# Patient Record
Sex: FEMALE | Race: WHITE | NOT HISPANIC OR LATINO
[De-identification: names, ages, dates, MRNs, and addresses within clinical notes are randomized per-mention and may not be internally consistent; named-entity substitution may affect disease eponyms.]

---

## 2022-09-21 ENCOUNTER — TRANSCRIPTION ENCOUNTER (OUTPATIENT)
Age: 72
End: 2022-09-21

## 2022-12-03 ENCOUNTER — TRANSCRIPTION ENCOUNTER (OUTPATIENT)
Age: 72
End: 2022-12-03

## 2022-12-13 ENCOUNTER — TRANSCRIPTION ENCOUNTER (OUTPATIENT)
Age: 72
End: 2022-12-13

## 2023-01-04 ENCOUNTER — APPOINTMENT (OUTPATIENT)
Dept: THORACIC SURGERY | Facility: HOSPITAL | Age: 73
End: 2023-01-04

## 2023-01-04 ENCOUNTER — TRANSCRIPTION ENCOUNTER (OUTPATIENT)
Age: 73
End: 2023-01-04

## 2023-01-04 PROBLEM — Z00.00 ENCOUNTER FOR PREVENTIVE HEALTH EXAMINATION: Status: ACTIVE | Noted: 2023-01-04

## 2023-01-11 ENCOUNTER — TRANSCRIPTION ENCOUNTER (OUTPATIENT)
Age: 73
End: 2023-01-11

## 2023-01-19 ENCOUNTER — APPOINTMENT (OUTPATIENT)
Dept: THORACIC SURGERY | Facility: CLINIC | Age: 73
End: 2023-01-19

## 2023-01-19 DIAGNOSIS — E44.0 MODERATE PROTEIN-CALORIE MALNUTRITION: ICD-10-CM

## 2023-01-19 DIAGNOSIS — Z86.79 PERSONAL HISTORY OF OTHER DISEASES OF THE CIRCULATORY SYSTEM: ICD-10-CM

## 2023-01-19 DIAGNOSIS — E83.42 HYPOMAGNESEMIA: ICD-10-CM

## 2023-01-19 DIAGNOSIS — D64.9 ANEMIA, UNSPECIFIED: ICD-10-CM

## 2023-01-19 DIAGNOSIS — Z87.09 PERSONAL HISTORY OF OTHER DISEASES OF THE RESPIRATORY SYSTEM: ICD-10-CM

## 2023-01-19 DIAGNOSIS — Z87.19 PERSONAL HISTORY OF OTHER DISEASES OF THE DIGESTIVE SYSTEM: ICD-10-CM

## 2023-01-19 DIAGNOSIS — J90 PLEURAL EFFUSION, NOT ELSEWHERE CLASSIFIED: ICD-10-CM

## 2023-01-19 DIAGNOSIS — I82.453 BILATERAL PERONEAL VEIN ACUTE EMBOLISM AND THROMBOSIS: ICD-10-CM

## 2023-01-25 NOTE — REASON FOR VISIT
[de-identified] : R VATS, drainage of effusion, TALC pleurodesis and intercostal nerve block [de-identified] : 1/4/2023 [de-identified] : 72 year old woman with stage IV lung cancer, undergoing treatment with Keytruda, complicated by immunotherapy induced colitis s/p Remicade infusion/prednisone complicated by bleeding duodenal ulcer, COPD, IJ thrombosis on lovenox, hepatic flexure colon mass, splenic infarct who presented to the hospital with shortness of breath and was found to have a right pleural effusion. She underwent a thoracentesis and 1800 cc of fluid was drained. A follow up chest x-ray from several days later showed reaccumulation of the the effusion. Thoracic surgery was consulted. Patient is now s/p R VATS, drainage of effusion, TALC pleurodesis and intercostal nerve block. Patient presents to office for post operative appointment.

## 2023-01-27 ENCOUNTER — APPOINTMENT (OUTPATIENT)
Dept: THORACIC SURGERY | Facility: CLINIC | Age: 73
End: 2023-01-27
Payer: MEDICARE

## 2023-01-27 PROCEDURE — 99024 POSTOP FOLLOW-UP VISIT: CPT

## 2023-06-10 ENCOUNTER — TRANSCRIPTION ENCOUNTER (OUTPATIENT)
Age: 73
End: 2023-06-10

## 2023-07-28 ENCOUNTER — OFFICE (OUTPATIENT)
Dept: URBAN - METROPOLITAN AREA CLINIC 122 | Facility: CLINIC | Age: 73
Setting detail: OPHTHALMOLOGY
End: 2023-07-28
Payer: MEDICARE

## 2023-07-28 ENCOUNTER — RX ONLY (RX ONLY)
Age: 73
End: 2023-07-28

## 2023-07-28 DIAGNOSIS — H11.153: ICD-10-CM

## 2023-07-28 DIAGNOSIS — H25.13: ICD-10-CM

## 2023-07-28 DIAGNOSIS — H52.4: ICD-10-CM

## 2023-07-28 DIAGNOSIS — H16.223: ICD-10-CM

## 2023-07-28 PROCEDURE — 92014 COMPRE OPH EXAM EST PT 1/>: CPT | Performed by: OPHTHALMOLOGY

## 2023-07-28 PROCEDURE — 92015 DETERMINE REFRACTIVE STATE: CPT | Performed by: OPHTHALMOLOGY

## 2023-07-28 ASSESSMENT — REFRACTION_MANIFEST
OD_SPHERE: +1.50
OS_ADD: +2.50
OD_ADD: +2.50
OS_VA1: 20/20-
OS_SPHERE: +1.25
OD_VA1: 20/20-
OD_CYLINDER: -1.25
OD_AXIS: 85
OS_ADD: +2.50
OD_SPHERE: +1.50
OD_ADD: +2.50
OD_AXIS: 90
OS_CYLINDER: -1.25
OD_VA1: 20/40
OS_CYLINDER: -1.25
OS_AXIS: 90
OS_VA1: 20/40-2
OS_SPHERE: +1.75
OD_CYLINDER: -1.25
OS_AXIS: 90

## 2023-07-28 ASSESSMENT — SPHEQUIV_DERIVED
OD_SPHEQUIV: 0.875
OS_SPHEQUIV: 0.75
OS_SPHEQUIV: 1.125
OD_SPHEQUIV: 0.875
OS_SPHEQUIV: 0.625
OD_SPHEQUIV: 0.875

## 2023-07-28 ASSESSMENT — CONFRONTATIONAL VISUAL FIELD TEST (CVF)
OS_FINDINGS: FULL
OD_FINDINGS: FULL

## 2023-07-28 ASSESSMENT — VISUAL ACUITY
OD_BCVA: 20/50-2
OS_BCVA: 20/40-2

## 2023-07-28 ASSESSMENT — REFRACTION_CURRENTRX
OD_ADD: +2.50
OD_CYLINDER: -1.25
OD_VPRISM_DIRECTION: PROGS
OS_AXIS: 90
OS_ADD: +2.50
OS_OVR_VA: 20/
OS_SPHERE: +1.75
OD_AXIS: 85
OS_CYLINDER: -1.25
OD_SPHERE: +1.50
OD_OVR_VA: 20/
OS_VPRISM_DIRECTION: PROGS

## 2023-07-28 ASSESSMENT — REFRACTION_AUTOREFRACTION
OS_SPHERE: +2.00
OS_CYLINDER: -2.50
OD_SPHERE: +2.25
OS_AXIS: 85
OD_AXIS: 100
OD_CYLINDER: -2.75

## 2023-07-28 ASSESSMENT — TEAR BREAK UP TIME (TBUT)
OS_TBUT: 2+
OD_TBUT: 2+

## 2023-07-28 ASSESSMENT — TONOMETRY
OS_IOP_MMHG: 17
OD_IOP_MMHG: 16

## 2024-02-22 ENCOUNTER — TRANSCRIPTION ENCOUNTER (OUTPATIENT)
Age: 74
End: 2024-02-22

## 2024-03-04 ENCOUNTER — TRANSCRIPTION ENCOUNTER (OUTPATIENT)
Age: 74
End: 2024-03-04

## 2024-05-20 PROBLEM — Z85.118 HISTORY OF MALIGNANT NEOPLASM OF LUNG: Status: RESOLVED | Noted: 2023-01-19 | Resolved: 2024-05-20

## 2024-05-22 ENCOUNTER — APPOINTMENT (OUTPATIENT)
Dept: RADIATION ONCOLOGY | Facility: CLINIC | Age: 74
End: 2024-05-22
Payer: MEDICARE

## 2024-05-22 VITALS
HEART RATE: 117 BPM | HEIGHT: 65 IN | OXYGEN SATURATION: 95 % | BODY MASS INDEX: 20.83 KG/M2 | DIASTOLIC BLOOD PRESSURE: 70 MMHG | RESPIRATION RATE: 18 BRPM | SYSTOLIC BLOOD PRESSURE: 106 MMHG | WEIGHT: 125 LBS

## 2024-05-22 DIAGNOSIS — Z85.118 PERSONAL HISTORY OF OTHER MALIGNANT NEOPLASM OF BRONCHUS AND LUNG: ICD-10-CM

## 2024-05-22 PROCEDURE — 99204 OFFICE O/P NEW MOD 45 MIN: CPT | Mod: 25

## 2024-05-22 RX ORDER — ENOXAPARIN SODIUM 40 MG/.4ML
INJECTION SUBCUTANEOUS
Refills: 0 | Status: ACTIVE | COMMUNITY

## 2024-05-22 RX ORDER — FOLIC ACID 1 MG/1
1 TABLET ORAL
Refills: 0 | Status: ACTIVE | COMMUNITY

## 2024-05-22 RX ORDER — DEXAMETHASONE 4 MG/1
4 TABLET ORAL
Refills: 0 | Status: ACTIVE | COMMUNITY

## 2024-05-22 NOTE — REASON FOR VISIT
[Consideration of Curative Therapy] : consideration of curative therapy for [Lung Cancer] : lung cancer [Consideration for Non-Curative Therapy] : consideration for non-curative therapy for

## 2024-05-22 NOTE — VITALS
[Maximal Pain Intensity: 0/10] : 0/10 [70: Cares for self; unalbe to carry on normal activity or do active work.] : 70: Cares for self; unable to carry on normal activity or do active work. [Date: ____________] : Patient's last distress assessment performed on [unfilled]. [9 - Distress Level] : Distress Level: 9

## 2024-05-23 NOTE — REVIEW OF SYSTEMS
[Shortness Of Breath] : shortness of breath [SOB on Exertion] : shortness of breath during exertion [Diarrhea] : diarrhea [Easy Bruising] : a tendency for easy bruising [Negative] : Endocrine [de-identified] : patient on Lovenox

## 2024-05-23 NOTE — HISTORY OF PRESENT ILLNESS
[FreeTextEntry1] : Ms. Han is a 73-year-old female with metastatic adenocarcinoma of the lung, diagnosed in 2022. Her PMHx includes breast cancer, SCC, Bilateral jugular DVT, nephrolithiasis, splenic infarct, Lyme Disease, Vit D Deficiency, depression, and pleural effusion. Family history of lung cancer with both her father and mother.  She initially presented in 9/11/22 for c/o right sided neck pain that started after lifting a box and was admitted at [facility].  CT Cervical Spine (09/19/22) revealed: No acute cervical fracture or traumatic malalignment. Multilevel cervical spondylosis.  CT neck (09/19/22) revealed right internal jugular vein thrombpsis, numerous enlarged mediastinal lymph nodes some demonstrating central necrosis, and a spiculated 2.1 x 1.6 cm mass within the right upper lobe concerning for pulmonary neoplasm.   CT Chest, Abdomen, Pelvis (09/20/22) revealed: 2.8 cm right upper lobe spiculated lesion extending to pleural surface high suspicious for bronchogenic neoplasm. Few scattered small nodules related to 3 mm too small to characterize. Extensive mediastinal necrotic lymphadenopathy. Mild right hilar lymphadenopathy. Small right pleural effusion. Subcentimeter right hepatic lobe hypodensity too small to characterize. No abdominal or pelvic lymphadenopathy. No bowel obstruction or ascites.  CT Guided RUL nodule biopsy (09/21/22) revealed an ~ approximately 1.6x 1.9 cm spiculated lesion in the posterior right upper lobe.   Pathology: NSCLC, TTF-1 and PD-L1 (50-60%) Positive. P40, Synaptophysin, Chromogranin - negative.  She had right internal jugular vein DVT on admission, she was started on Eliquis and is now on long-term Lovenox.  11/21/22 - CT Abdomen & Pelvis for abdominal pain revealed a left-sided colitis/proctitis. Possible hepatic flexure neoplasm. Small splenic infarct, moderate pleural effusion.  Treated with Keytruda in November 2022, complicated by immunotherapy induced colitis s/p Remicade infusion/prednisone complicated by bleeding duodenal ulcer, COPD, IJ thrombosis on lovenox, hepatic flexure colon mass, splenic infarct. She is s/p Alimta carboplatin, now on Alimta maintenance, managed by Dr. Goldberg.  01/04/23 - Right VATS, drainage of effusion, TALC pleurodesis and intercostal nerve block with Dr. Sales. Pathology: hispotpathologic section showed cluster reactive mesothelial cells, histiocytes, and lymphocytes. Mesothelial cells positive for Calretinin and WT-1. Epithelial martkers TTF-1, JOSE EP4, and MOC-31 negative.  03/22/23 - Flex sigmoidoscopy. Colon biopsy (induced colitis) showed active colitis with cytitis. 06/10/23 - Patient had rectal bleeding was referred to GI (Dr. Kilgore) - still with active colitis,  negative for dysplasia and granuloma.  05/22/23 - PET Scan ([facility]) revealed a 2.3 spiculated RUL nodule with increased size and activity from prior PET/CT. Hypermetabolic right hilar LN, slightly increased in activity. New multifocal hypermetabolic right pleural activity, consistent with pleural metastatic disease. Resolution of multifocal hypermetabolic cervical LN and extensive hypermetabolic mediastinal LN from prior PET CT. Resolution of multifocal hypermetabolic soft tissue nodule left deltoid. New small nonspecific focal mild activity right posterior larynx without obvious CT Correlate.  09/28/23 CT C/A/P at [facility] demonstrated a decrease in the RUL lesion. Right chest pleural thickening most prominent posterior medial mildly decreased. Subcentimeter right hilar LN decrased. No evidence of abdominal or pelvis metastatic disease.  01/25/24 CT C/A/P at [facility] revealed: 1. no interval change in the spiculated mass in the RUL or scattered pleural nodularity and right basilar airspace disease. No suspicious new nodules. 2. Interval increase in the right hilar adenopathy, slightly necrotic in appearance. 2. No intra-abdominal or pelvic lymphadenopathy or metastatic disease.  05/02/24 Last follow-up with Dr. Goldberg wherein she c/o dyspnea related to emphysema without pneumonitis.  05/13/24 CT C/A/P at [facility] revealed: 1. interval worsening of disease with large heterogeneous mass at the right hilum/medial aspect right lung significantly increased in size and a new 5mm nodular component at the inferior aspect of the elongated spiculated mass in the posterior RUL. 2. Hypodense focus in the right pulmonary vein to its junction with the right atrium concerning for tumor thrombus.  05/22/24 She presents in clinic today to discuss treatment with radiation therapy. She reports that she is now off Alimta and will be switching to Taxotere.  She feels dyspneic on exertion which she reports to be chronic. Her appetite is fair. She denies hemoptysis, dizziness, headaches, or changes to her vision/balance.

## 2024-06-03 ENCOUNTER — APPOINTMENT (OUTPATIENT)
Dept: HEMATOLOGY ONCOLOGY | Facility: CLINIC | Age: 74
End: 2024-06-03
Payer: MEDICARE

## 2024-06-03 ENCOUNTER — LABORATORY RESULT (OUTPATIENT)
Age: 74
End: 2024-06-03

## 2024-06-03 VITALS
BODY MASS INDEX: 20.83 KG/M2 | WEIGHT: 125 LBS | TEMPERATURE: 98.9 F | HEIGHT: 65 IN | SYSTOLIC BLOOD PRESSURE: 114 MMHG | OXYGEN SATURATION: 95 % | DIASTOLIC BLOOD PRESSURE: 76 MMHG | RESPIRATION RATE: 18 BRPM | HEART RATE: 120 BPM

## 2024-06-03 PROCEDURE — 99205 OFFICE O/P NEW HI 60 MIN: CPT

## 2024-06-03 PROCEDURE — G2211 COMPLEX E/M VISIT ADD ON: CPT | Mod: NC,1L

## 2024-06-03 RX ORDER — ENOXAPARIN SODIUM 100 MG/ML
100 INJECTION, SOLUTION SUBCUTANEOUS
Qty: 30 | Refills: 0 | Status: ACTIVE | COMMUNITY
Start: 2024-05-16

## 2024-06-03 RX ORDER — ALPRAZOLAM 0.25 MG/1
0.25 TABLET ORAL
Qty: 10 | Refills: 0 | Status: ACTIVE | COMMUNITY
Start: 2024-05-21

## 2024-06-03 RX ORDER — NITROFURANTOIN MACROCRYSTALS 100 MG/1
100 CAPSULE ORAL
Qty: 10 | Refills: 0 | Status: DISCONTINUED | COMMUNITY
Start: 2024-02-19

## 2024-06-03 RX ORDER — PANTOPRAZOLE 40 MG/1
40 TABLET, DELAYED RELEASE ORAL
Qty: 90 | Refills: 0 | Status: ACTIVE | COMMUNITY
Start: 2024-05-30

## 2024-06-03 RX ORDER — VANCOMYCIN HYDROCHLORIDE 125 MG/1
125 CAPSULE ORAL
Qty: 36 | Refills: 0 | Status: ACTIVE | COMMUNITY
Start: 2024-03-04

## 2024-06-05 NOTE — PHYSICAL EXAM
[Restricted in physically strenuous activity but ambulatory and able to carry out work of a light or sedentary nature] : Status 1- Restricted in physically strenuous activity but ambulatory and able to carry out work of a light or sedentary nature, e.g., light house work, office work [Normal] : affect appropriate [de-identified] : +enlarged veins on chest

## 2024-06-05 NOTE — HISTORY OF PRESENT ILLNESS
[de-identified] : Ms. Han is a 73-year-old female with metastatic adenocarcinoma of the lung, diagnosed in 9/2022. Here for second opinion.  Initially presented in 9/11/22 for c/o right sided neck pain that started after lifting a box and was admitted at [facility].  CT Cervical Spine (09/19/22) revealed: No acute cervical fracture or traumatic malalignment. Multilevel cervical spondylosis.  CT neck (09/19/22) revealed right internal jugular vein thrombosis, numerous enlarged mediastinal lymph nodes some demonstrating central necrosis, and a spiculated 2.1 x 1.6 cm mass within the right upper lobe concerning for pulmonary neoplasm.  CT C/A/P (09/20/22) revealed: 2.8 cm right upper lobe spiculated lesion extending to pleural surface high suspicious for bronchogenic neoplasm. Few scattered small nodules related to 3 mm too small to characterize. Extensive mediastinal necrotic lymphadenopathy. Mild right hilar lymphadenopathy. Small right pleural effusion. Subcentimeter right hepatic lobe hypodensity too small to characterize. No abdominal or pelvic lymphadenopathy. No bowel obstruction or ascites.  CT Guided RUL nodule biopsy (09/21/22)  ~ approximately 1.6x 1.9 cm spiculated lesion in the posterior right upper lobe.  Pathology: NSCLC, TTF-1 and PD-L1 (50-60%) Positive. P40, Synaptophysin, Chromogranin - negative.  Right internal jugular vein DVT on admission, she was started on Eliquis and is now on long-term Lovenox.  11/21/22 - CT A/P: left-sided colitis/proctitis. Possible hepatic flexure neoplasm. Small splenic infarct, moderate pleural effusion.   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   She is s/p Alimta carboplatin, now on Alimta maintenance, managed by Dr. Goldberg-[facility].  01/04/23 - Right VATS, drainage of effusion, TALC pleurodesis and intercostal nerve block with Dr. Sales. Pathology: histopathologic section showed cluster reactive mesothelial cells, histiocytes, and lymphocytes. Mesothelial cells positive for Calretinin and WT-1. Epithelial martkers TTF-1, JOSE EP4, and MOC-31 negative.  03/22/23 - Flex sigmoidoscopy. Colon biopsy (induced colitis) showed active colitis with cytitis. 06/10/23 - Patient had rectal bleeding was referred to GI (Dr. Kilgore) - still with active colitis, negative for dysplasia and granuloma.  05/22/23 - PET Scan ([facility]) revealed a 2.3 spiculated RUL nodule with increased size and activity from prior PET/CT. Hypermetabolic right hilar LN, slightly increased in activity. New multifocal hypermetabolic right pleural activity, consistent with pleural metastatic disease. Resolution of multifocal hypermetabolic cervical LN and extensive hypermetabolic mediastinal LN from prior PET CT. Resolution of multifocal hypermetabolic soft tissue nodule left deltoid. New small nonspecific focal mild activity right posterior larynx without obvious CT Correlate.  09/28/23 CT C/A/P at [facility] demonstrated a decrease in the RUL lesion. Right chest pleural thickening most prominent posterior medial mildly decreased. Subcentimeter right hilar LN decreased. No evidence of abdominal or pelvis metastatic disease.  01/25/24 CT C/A/P at [facility] revealed: 1. no interval change in the spiculated mass in the RUL or scattered pleural nodularity and right basilar airspace disease. No suspicious new nodules. 2. Interval increase in the right hilar adenopathy, slightly necrotic in appearance. 2. No intra-abdominal or pelvic lymphadenopathy or metastatic disease.  05/02/24 Last follow-up with Dr. Goldberg wherein she c/o dyspnea related to emphysema without pneumonitis.  05/13/24 CT C/A/P at [facility] revealed: 1. interval worsening of disease with large heterogeneous mass at the right hilum/medial aspect right lung significantly increased in size and a new 5mm nodular component at the inferior aspect of the elongated spiculated mass in the posterior RUL. 2. Hypodense focus in the right pulmonary vein to its junction with the right atrium concerning for tumor thrombus.  05/22/24 Seen by Rad Onc to evaluate for localized hilar RT  She reports that she is now off Alimta and will be switching to Taxotere.  She feels POLO and continues to have a chronic cough.    Pmhx: breast cancer, SCC, Bilateral jugular DVT, nephrolithiasis, Lyme Disease, Vit D Deficiency, depression.  Family history of lung cancer with both her father and mother.  [1 - Distress Level] : Distress Level: 1 [ECOG Performance Status: 1 - Restricted in physically strenuous activity but ambulatory and able to carry out work of a light or sedentary nature] : Performance Status: 1 - Restricted in physically strenuous activity but ambulatory and able to carry out work of a light or sedentary nature, e.g., light house work, office work

## 2024-06-05 NOTE — ASSESSMENT
[FreeTextEntry1] : Reviewed diagnosis imaging and prior treatment plan with the patient and her  PDL1>50% started on Keytruda in 11/2022 however unfortunately complicated by steroid refractory colitis. Currently on Entyvio Patient most recently on carbo/Alimta with Alimta maintenance now with POD primarily in right hilar region. Seen by rad onc 5/22/24 who recommended directed therapy to hilum  Patient here for a second opinion concerning further systemic therapy options  No actionable mutations noted on initial pathology  Discussed second line options which include Taxotere, gemcitabine, or nab-paclitaxel. Discussed options and would favor taxotere. AE discussed.   Patient currently following with [facility] and states they recommend the same.   >CBC, CMP, Guardant 360 CDx, CEA >follow up with Rad Onc for hilar RT >follow up with primary oncologist. Patient considering to change care to [facility].

## 2024-06-05 NOTE — REVIEW OF SYSTEMS
[Cough] : cough [SOB on Exertion] : shortness of breath during exertion [Diarrhea: Grade 1 - Increase of <4 stools per day over baseline; mild increase in ostomy output compared to baseline] : Diarrhea: Grade 1 - Increase of <4 stools per day over baseline; mild increase in ostomy output compared to baseline [Negative] : Allergic/Immunologic

## 2024-06-11 RX ORDER — DEXAMETHASONE 4 MG/1
4 TABLET ORAL TWICE DAILY
Qty: 12 | Refills: 3 | Status: ACTIVE | COMMUNITY
Start: 2024-06-11 | End: 1900-01-01

## 2024-06-17 ENCOUNTER — NON-APPOINTMENT (OUTPATIENT)
Age: 74
End: 2024-06-17

## 2024-06-18 VITALS
BODY MASS INDEX: 20.83 KG/M2 | DIASTOLIC BLOOD PRESSURE: 94 MMHG | OXYGEN SATURATION: 100 % | HEIGHT: 65 IN | WEIGHT: 125 LBS | SYSTOLIC BLOOD PRESSURE: 142 MMHG | HEART RATE: 125 BPM | RESPIRATION RATE: 18 BRPM

## 2024-06-18 DIAGNOSIS — C34.90 MALIGNANT NEOPLASM OF UNSPECIFIED PART OF UNSPECIFIED BRONCHUS OR LUNG: ICD-10-CM

## 2024-06-18 NOTE — DISEASE MANAGEMENT
[Clinical] : TNM Stage: c [IV] : IV [TTNM] : x [NTNM] : x [MTNM] : 1 [de-identified] : 2000 [de-identified] : 2000 [de-identified] : right lung

## 2024-06-18 NOTE — VITALS
[Maximal Pain Intensity: 0/10] : 0/10 [80: Normal activity with effort; some signs or symptoms of disease.] : 80: Normal activity with effort; some signs or symptoms of disease.  Skyrizi Counseling: I discussed with the patient the risks of risankizumab-rzaa including but not limited to immunosuppression, and serious infections.  The patient understands that monitoring is required including a PPD at baseline and must alert us or the primary physician if symptoms of infection or other concerning signs are noted.

## 2024-06-18 NOTE — HISTORY OF PRESENT ILLNESS
[FreeTextEntry1] : 6/18/2024 Ms Han presents today for her OTV.  She completed 4/4 fractions to the right lung.  The patient reports SOB on exertion and has a cough with clear phlegm.  Her appetite is healthy.

## 2024-07-16 ENCOUNTER — RESULT REVIEW (OUTPATIENT)
Age: 74
End: 2024-07-16

## 2024-07-16 ENCOUNTER — APPOINTMENT (OUTPATIENT)
Dept: HEMATOLOGY ONCOLOGY | Facility: CLINIC | Age: 74
End: 2024-07-16
Payer: MEDICARE

## 2024-07-16 VITALS
WEIGHT: 126 LBS | TEMPERATURE: 97.3 F | RESPIRATION RATE: 18 BRPM | DIASTOLIC BLOOD PRESSURE: 57 MMHG | BODY MASS INDEX: 20.99 KG/M2 | HEART RATE: 113 BPM | HEIGHT: 65 IN | OXYGEN SATURATION: 94 % | SYSTOLIC BLOOD PRESSURE: 105 MMHG

## 2024-07-16 DIAGNOSIS — Z00.00 ENCOUNTER FOR GENERAL ADULT MEDICAL EXAMINATION W/OUT ABNORMAL FINDINGS: ICD-10-CM

## 2024-07-16 DIAGNOSIS — M80.80XG OTHER OSTEOPOROSIS WITH CURRENT PATHOLOGICAL FRACTURE, UNSPECIFIED SITE, SUBSEQUENT ENCOUNTER FOR FRACTURE WITH DELAYED HEALING: ICD-10-CM

## 2024-07-16 PROCEDURE — G2211 COMPLEX E/M VISIT ADD ON: CPT

## 2024-07-16 PROCEDURE — 99215 OFFICE O/P EST HI 40 MIN: CPT

## 2024-07-18 ENCOUNTER — APPOINTMENT (OUTPATIENT)
Dept: RADIATION ONCOLOGY | Facility: CLINIC | Age: 74
End: 2024-07-18
Payer: MEDICARE

## 2024-07-18 VITALS
DIASTOLIC BLOOD PRESSURE: 69 MMHG | TEMPERATURE: 98 F | RESPIRATION RATE: 16 BRPM | SYSTOLIC BLOOD PRESSURE: 114 MMHG | HEART RATE: 108 BPM | HEIGHT: 65 IN | WEIGHT: 123 LBS | BODY MASS INDEX: 20.49 KG/M2 | OXYGEN SATURATION: 95 %

## 2024-07-18 PROCEDURE — 99024 POSTOP FOLLOW-UP VISIT: CPT

## 2024-07-22 ENCOUNTER — NON-APPOINTMENT (OUTPATIENT)
Age: 74
End: 2024-07-22

## 2024-07-23 ENCOUNTER — RESULT REVIEW (OUTPATIENT)
Age: 74
End: 2024-07-23

## 2024-07-23 ENCOUNTER — APPOINTMENT (OUTPATIENT)
Dept: HEMATOLOGY ONCOLOGY | Facility: CLINIC | Age: 74
End: 2024-07-23
Payer: MEDICARE

## 2024-07-23 ENCOUNTER — LABORATORY RESULT (OUTPATIENT)
Age: 74
End: 2024-07-23

## 2024-07-23 VITALS
DIASTOLIC BLOOD PRESSURE: 52 MMHG | RESPIRATION RATE: 16 BRPM | OXYGEN SATURATION: 91 % | TEMPERATURE: 98.6 F | HEIGHT: 65 IN | WEIGHT: 119 LBS | SYSTOLIC BLOOD PRESSURE: 93 MMHG | HEART RATE: 125 BPM | BODY MASS INDEX: 19.83 KG/M2

## 2024-07-23 DIAGNOSIS — C34.90 MALIGNANT NEOPLASM OF UNSPECIFIED PART OF UNSPECIFIED BRONCHUS OR LUNG: ICD-10-CM

## 2024-07-23 PROCEDURE — G2211 COMPLEX E/M VISIT ADD ON: CPT

## 2024-07-23 PROCEDURE — 99215 OFFICE O/P EST HI 40 MIN: CPT

## 2024-07-23 NOTE — HISTORY OF PRESENT ILLNESS
[1 - Distress Level] : Distress Level: 1 [ECOG Performance Status: 1 - Restricted in physically strenuous activity but ambulatory and able to carry out work of a light or sedentary nature] : Performance Status: 1 - Restricted in physically strenuous activity but ambulatory and able to carry out work of a light or sedentary nature, e.g., light house work, office work [de-identified] : Ms. Han is a 73-year-old female with metastatic adenocarcinoma of the lung, diagnosed in 9/2022. Here for second opinion.  Initially presented in 9/11/22 for c/o right sided neck pain that started after lifting a box and was admitted at [facility].  CT Cervical Spine (09/19/22) revealed: No acute cervical fracture or traumatic malalignment. Multilevel cervical spondylosis.  CT neck (09/19/22) revealed right internal jugular vein thrombosis, numerous enlarged mediastinal lymph nodes some demonstrating central necrosis, and a spiculated 2.1 x 1.6 cm mass within the right upper lobe concerning for pulmonary neoplasm.  CT C/A/P (09/20/22) revealed: 2.8 cm right upper lobe spiculated lesion extending to pleural surface high suspicious for bronchogenic neoplasm. Few scattered small nodules related to 3 mm too small to characterize. Extensive mediastinal necrotic lymphadenopathy. Mild right hilar lymphadenopathy. Small right pleural effusion. Subcentimeter right hepatic lobe hypodensity too small to characterize. No abdominal or pelvic lymphadenopathy. No bowel obstruction or ascites.  CT Guided RUL nodule biopsy (09/21/22)  ~ approximately 1.6x 1.9 cm spiculated lesion in the posterior right upper lobe.  Pathology: NSCLC, TTF-1 and PD-L1 (50-60%) Positive. P40, Synaptophysin, Chromogranin - negative.  Right internal jugular vein DVT on admission, she was started on Eliquis and is now on long-term Lovenox.  11/21/22 - CT A/P: left-sided colitis/proctitis. Possible hepatic flexure neoplasm. Small splenic infarct, moderate pleural effusion.   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   She is s/p Alimta carboplatin, now on Alimta maintenance, managed by Dr. Goldberg-[facility].  01/04/23 - Right VATS, drainage of effusion, TALC pleurodesis and intercostal nerve block with Dr. Sales. Pathology: histopathologic section showed cluster reactive mesothelial cells, histiocytes, and lymphocytes. Mesothelial cells positive for Calretinin and WT-1. Epithelial martkers TTF-1, JOSE EP4, and MOC-31 negative.  03/22/23 - Flex sigmoidoscopy. Colon biopsy (induced colitis) showed active colitis with cytitis. 06/10/23 - Patient had rectal bleeding was referred to GI (Dr. Kilgore) - still with active colitis, negative for dysplasia and granuloma.  05/22/23 - PET Scan ([facility]) revealed a 2.3 spiculated RUL nodule with increased size and activity from prior PET/CT. Hypermetabolic right hilar LN, slightly increased in activity. New multifocal hypermetabolic right pleural activity, consistent with pleural metastatic disease. Resolution of multifocal hypermetabolic cervical LN and extensive hypermetabolic mediastinal LN from prior PET CT. Resolution of multifocal hypermetabolic soft tissue nodule left deltoid. New small nonspecific focal mild activity right posterior larynx without obvious CT Correlate.  09/28/23 CT C/A/P at [facility] demonstrated a decrease in the RUL lesion. Right chest pleural thickening most prominent posterior medial mildly decreased. Subcentimeter right hilar LN decreased. No evidence of abdominal or pelvis metastatic disease.  01/25/24 CT C/A/P at [facility] revealed: 1. no interval change in the spiculated mass in the RUL or scattered pleural nodularity and right basilar airspace disease. No suspicious new nodules. 2. Interval increase in the right hilar adenopathy, slightly necrotic in appearance. 2. No intra-abdominal or pelvic lymphadenopathy or metastatic disease.  05/02/24 Last follow-up with Dr. Goldberg wherein she c/o dyspnea related to emphysema without pneumonitis.  05/13/24 CT C/A/P at [facility] revealed: 1. interval worsening of disease with large heterogeneous mass at the right hilum/medial aspect right lung significantly increased in size and a new 5mm nodular component at the inferior aspect of the elongated spiculated mass in the posterior RUL. 2. Hypodense focus in the right pulmonary vein to its junction with the right atrium concerning for tumor thrombus.  05/22/24 Seen by Rad Onc to evaluate for localized hilar RT  She reports that she is now off Alimta and will be switching to Taxotere.  She feels POLO and continues to have a chronic cough.    Pmhx: breast cancer, SCC, Bilateral jugular DVT, nephrolithiasis, Lyme Disease, Vit D Deficiency, depression.  Family history of lung cancer with both her father and mother.  [de-identified] : here for follow up after cycle 1 continues to notice cough with phlegm sine RT. Denies blood Also intermittent low grade fevers X 2 weeks. Tmax 101.8. Denies any infections. Felt fatigue after treatment. no other symptoms

## 2024-07-23 NOTE — HISTORY OF PRESENT ILLNESS
[1 - Distress Level] : Distress Level: 1 [ECOG Performance Status: 1 - Restricted in physically strenuous activity but ambulatory and able to carry out work of a light or sedentary nature] : Performance Status: 1 - Restricted in physically strenuous activity but ambulatory and able to carry out work of a light or sedentary nature, e.g., light house work, office work [de-identified] : Ms. Han is a 73-year-old female with metastatic adenocarcinoma of the lung, diagnosed in 9/2022. Here for second opinion.  Initially presented in 9/11/22 for c/o right sided neck pain that started after lifting a box and was admitted at [facility].  CT Cervical Spine (09/19/22) revealed: No acute cervical fracture or traumatic malalignment. Multilevel cervical spondylosis.  CT neck (09/19/22) revealed right internal jugular vein thrombosis, numerous enlarged mediastinal lymph nodes some demonstrating central necrosis, and a spiculated 2.1 x 1.6 cm mass within the right upper lobe concerning for pulmonary neoplasm.  CT C/A/P (09/20/22) revealed: 2.8 cm right upper lobe spiculated lesion extending to pleural surface high suspicious for bronchogenic neoplasm. Few scattered small nodules related to 3 mm too small to characterize. Extensive mediastinal necrotic lymphadenopathy. Mild right hilar lymphadenopathy. Small right pleural effusion. Subcentimeter right hepatic lobe hypodensity too small to characterize. No abdominal or pelvic lymphadenopathy. No bowel obstruction or ascites.  CT Guided RUL nodule biopsy (09/21/22)  ~ approximately 1.6x 1.9 cm spiculated lesion in the posterior right upper lobe.  Pathology: NSCLC, TTF-1 and PD-L1 (50-60%) Positive. P40, Synaptophysin, Chromogranin - negative.  Right internal jugular vein DVT on admission, she was started on Eliquis and is now on long-term Lovenox.  11/21/22 - CT A/P: left-sided colitis/proctitis. Possible hepatic flexure neoplasm. Small splenic infarct, moderate pleural effusion.   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   She is s/p Alimta carboplatin, now on Alimta maintenance, managed by Dr. [facility].  01/04/23 - Right VATS, drainage of effusion, TALC pleurodesis and intercostal nerve block with Dr. Sales. Pathology: histopathologic section showed cluster reactive mesothelial cells, histiocytes, and lymphocytes. Mesothelial cells positive for Calretinin and WT-1. Epithelial martkers TTF-1, JOSE EP4, and MOC-31 negative.  03/22/23 - Flex sigmoidoscopy. Colon biopsy (induced colitis) showed active colitis with cytitis. 06/10/23 - Patient had rectal bleeding was referred to GI (Dr. Kilgore) - still with active colitis, negative for dysplasia and granuloma.  05/22/23 - PET Scan ([facility]) revealed a 2.3 spiculated RUL nodule with increased size and activity from prior PET/CT. Hypermetabolic right hilar LN, slightly increased in activity. New multifocal hypermetabolic right pleural activity, consistent with pleural metastatic disease. Resolution of multifocal hypermetabolic cervical LN and extensive hypermetabolic mediastinal LN from prior PET CT. Resolution of multifocal hypermetabolic soft tissue nodule left deltoid. New small nonspecific focal mild activity right posterior larynx without obvious CT Correlate.  09/28/23 CT C/A/P at [facility] demonstrated a decrease in the RUL lesion. Right chest pleural thickening most prominent posterior medial mildly decreased. Subcentimeter right hilar LN decreased. No evidence of abdominal or pelvis metastatic disease.  01/25/24 CT C/A/P at [facility] revealed: 1. no interval change in the spiculated mass in the RUL or scattered pleural nodularity and right basilar airspace disease. No suspicious new nodules. 2. Interval increase in the right hilar adenopathy, slightly necrotic in appearance. 2. No intra-abdominal or pelvic lymphadenopathy or metastatic disease.  05/02/24 Last follow-up with Dr. Goldberg wherein she c/o dyspnea related to emphysema without pneumonitis.  05/13/24 CT C/A/P at [facility] revealed: 1. interval worsening of disease with large heterogeneous mass at the right hilum/medial aspect right lung significantly increased in size and a new 5mm nodular component at the inferior aspect of the elongated spiculated mass in the posterior RUL. 2. Hypodense focus in the right pulmonary vein to its junction with the right atrium concerning for tumor thrombus.  05/22/24 Seen by Rad Onc to evaluate for localized hilar RT  She reports that she is now off Alimta and will be switching to Taxotere.  She feels POLO and continues to have a chronic cough.    Pmhx: breast cancer, SCC, Bilateral jugular DVT, nephrolithiasis, Lyme Disease, Vit D Deficiency, depression.  Family history of lung cancer with both her father and mother.  [de-identified] : here for follow up after cycle 1 continues to notice cough with phlegm sine RT. Denies blood Also intermittent low grade fevers X 2 weeks. Tmax 101.8. Denies any infections. Felt fatigue after treatment. no other symptoms

## 2024-07-23 NOTE — ASSESSMENT
[FreeTextEntry1] : Reviewed diagnosis imaging and prior treatment plan with the patient and her  PDL1>50% started on Keytruda in 11/2022 however unfortunately complicated by steroid refractory colitis. Currently on Entyvio Patient most recently on carbo/Alimta with Alimta maintenance now with POD primarily in right hilar region. 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   7/16/24: C1 Taxotere  Intermittent fevers, no sign of infection. Likely tumor fever. Non-toxic  >CBC, CMP at every visit.  CEA added monthly >follow up with Rad Onc for hilar RT >will obtain imaging in 2-3 months to assess response  >Dexa scan for osteoporosis hx >Mammogram >zofran prn for nausea, tylenol prn for fever >continue Lovenox until next imaging to assess clot.   Will give NS and neupogen 300 mcg X1 today for neutropenia  RTC in 2 weeks

## 2024-07-23 NOTE — REVIEW OF SYSTEMS
[Cough] : cough [SOB on Exertion] : shortness of breath during exertion [Diarrhea: Grade 1 - Increase of <4 stools per day over baseline; mild increase in ostomy output compared to baseline] : Diarrhea: Grade 1 - Increase of <4 stools per day over baseline; mild increase in ostomy output compared to baseline [Anxiety] : anxiety [Negative] : Allergic/Immunologic [Suicidal] : not suicidal [Insomnia] : no insomnia

## 2024-07-23 NOTE — PHYSICAL EXAM
[Restricted in physically strenuous activity but ambulatory and able to carry out work of a light or sedentary nature] : Status 1- Restricted in physically strenuous activity but ambulatory and able to carry out work of a light or sedentary nature, e.g., light house work, office work [Normal] : affect appropriate [de-identified] : +enlarged veins on chest

## 2024-07-23 NOTE — PHYSICAL EXAM
[Restricted in physically strenuous activity but ambulatory and able to carry out work of a light or sedentary nature] : Status 1- Restricted in physically strenuous activity but ambulatory and able to carry out work of a light or sedentary nature, e.g., light house work, office work [Normal] : affect appropriate [de-identified] : +enlarged veins on chest

## 2024-08-01 NOTE — PHYSICAL EXAM
[Restricted in physically strenuous activity but ambulatory and able to carry out work of a light or sedentary nature] : Status 1- Restricted in physically strenuous activity but ambulatory and able to carry out work of a light or sedentary nature, e.g., light house work, office work [Normal] : affect appropriate [de-identified] : +enlarged veins on chest

## 2024-08-01 NOTE — REASON FOR VISIT
[Follow-Up Visit] : a follow-up [Initial Consultation] : an initial consultation [FreeTextEntry2] : Lung cancer

## 2024-08-01 NOTE — REVIEW OF SYSTEMS
[Cough] : cough [SOB on Exertion] : shortness of breath during exertion [Diarrhea: Grade 1 - Increase of <4 stools per day over baseline; mild increase in ostomy output compared to baseline] : Diarrhea: Grade 1 - Increase of <4 stools per day over baseline; mild increase in ostomy output compared to baseline [Suicidal] : not suicidal [Insomnia] : no insomnia [Anxiety] : anxiety [Negative] : Allergic/Immunologic

## 2024-08-01 NOTE — HISTORY OF PRESENT ILLNESS
[de-identified] : Ms. Han is a 73-year-old female with metastatic adenocarcinoma of the lung, diagnosed in 9/2022. Here for second opinion.  Initially presented in 9/11/22 for c/o right sided neck pain that started after lifting a box and was admitted at [facility].  CT Cervical Spine (09/19/22) revealed: No acute cervical fracture or traumatic malalignment. Multilevel cervical spondylosis.  CT neck (09/19/22) revealed right internal jugular vein thrombosis, numerous enlarged mediastinal lymph nodes some demonstrating central necrosis, and a spiculated 2.1 x 1.6 cm mass within the right upper lobe concerning for pulmonary neoplasm.  CT C/A/P (09/20/22) revealed: 2.8 cm right upper lobe spiculated lesion extending to pleural surface high suspicious for bronchogenic neoplasm. Few scattered small nodules related to 3 mm too small to characterize. Extensive mediastinal necrotic lymphadenopathy. Mild right hilar lymphadenopathy. Small right pleural effusion. Subcentimeter right hepatic lobe hypodensity too small to characterize. No abdominal or pelvic lymphadenopathy. No bowel obstruction or ascites.  CT Guided RUL nodule biopsy (09/21/22)  ~ approximately 1.6x 1.9 cm spiculated lesion in the posterior right upper lobe.  Pathology: NSCLC, TTF-1 and PD-L1 (50-60%) Positive. P40, Synaptophysin, Chromogranin - negative.  Right internal jugular vein DVT on admission, she was started on Eliquis and is now on long-term Lovenox.  11/21/22 - CT A/P: left-sided colitis/proctitis. Possible hepatic flexure neoplasm. Small splenic infarct, moderate pleural effusion.   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   She is s/p Alimta carboplatin, now on Alimta maintenance, managed by Dr. [facility].  01/04/23 - Right VATS, drainage of effusion, TALC pleurodesis and intercostal nerve block with Dr. Sales. Pathology: histopathologic section showed cluster reactive mesothelial cells, histiocytes, and lymphocytes. Mesothelial cells positive for Calretinin and WT-1. Epithelial martkers TTF-1, JOSE EP4, and MOC-31 negative.  03/22/23 - Flex sigmoidoscopy. Colon biopsy (induced colitis) showed active colitis with cytitis. 06/10/23 - Patient had rectal bleeding was referred to GI (Dr. Kilgore) - still with active colitis, negative for dysplasia and granuloma.  05/22/23 - PET Scan ([facility]) revealed a 2.3 spiculated RUL nodule with increased size and activity from prior PET/CT. Hypermetabolic right hilar LN, slightly increased in activity. New multifocal hypermetabolic right pleural activity, consistent with pleural metastatic disease. Resolution of multifocal hypermetabolic cervical LN and extensive hypermetabolic mediastinal LN from prior PET CT. Resolution of multifocal hypermetabolic soft tissue nodule left deltoid. New small nonspecific focal mild activity right posterior larynx without obvious CT Correlate.  09/28/23 CT C/A/P at [facility] demonstrated a decrease in the RUL lesion. Right chest pleural thickening most prominent posterior medial mildly decreased. Subcentimeter right hilar LN decreased. No evidence of abdominal or pelvis metastatic disease.  01/25/24 CT C/A/P at [facility] revealed: 1. no interval change in the spiculated mass in the RUL or scattered pleural nodularity and right basilar airspace disease. No suspicious new nodules. 2. Interval increase in the right hilar adenopathy, slightly necrotic in appearance. 2. No intra-abdominal or pelvic lymphadenopathy or metastatic disease.  05/02/24 Last follow-up with Dr. Goldberg wherein she c/o dyspnea related to emphysema without pneumonitis.  05/13/24 CT C/A/P at [facility] revealed: 1. interval worsening of disease with large heterogeneous mass at the right hilum/medial aspect right lung significantly increased in size and a new 5mm nodular component at the inferior aspect of the elongated spiculated mass in the posterior RUL. 2. Hypodense focus in the right pulmonary vein to its junction with the right atrium concerning for tumor thrombus.  05/22/24 Seen by Rad Onc to evaluate for localized hilar RT  She reports that she is now off Alimta and will be switching to Taxotere.  She feels POLO and continues to have a chronic cough.    Pmhx: breast cancer, SCC, Bilateral jugular DVT, nephrolithiasis, Lyme Disease, Vit D Deficiency, depression.  Family history of lung cancer with both her father and mother.  [de-identified] : here for follow up after cycle 1 continues to notice cough with phlegm sine RT. Denies blood Also intermittent low grade fevers X 2 weeks. Tmax 101.8. Denies any infections. Felt fatigue after treatment. no other symptoms [1 - Distress Level] : Distress Level: 1 [ECOG Performance Status: 1 - Restricted in physically strenuous activity but ambulatory and able to carry out work of a light or sedentary nature] : Performance Status: 1 - Restricted in physically strenuous activity but ambulatory and able to carry out work of a light or sedentary nature, e.g., light house work, office work

## 2024-08-05 ENCOUNTER — TRANSCRIPTION ENCOUNTER (OUTPATIENT)
Age: 74
End: 2024-08-05

## 2024-08-06 ENCOUNTER — APPOINTMENT (OUTPATIENT)
Dept: HEMATOLOGY ONCOLOGY | Facility: CLINIC | Age: 74
End: 2024-08-06

## 2024-08-12 ENCOUNTER — APPOINTMENT (OUTPATIENT)
Dept: HEMATOLOGY ONCOLOGY | Facility: CLINIC | Age: 74
End: 2024-08-12
Payer: MEDICARE

## 2024-08-12 ENCOUNTER — RESULT REVIEW (OUTPATIENT)
Age: 74
End: 2024-08-12

## 2024-08-12 VITALS
WEIGHT: 121.3 LBS | DIASTOLIC BLOOD PRESSURE: 58 MMHG | HEIGHT: 65 IN | RESPIRATION RATE: 16 BRPM | OXYGEN SATURATION: 90 % | BODY MASS INDEX: 20.21 KG/M2 | HEART RATE: 117 BPM | TEMPERATURE: 99 F | SYSTOLIC BLOOD PRESSURE: 87 MMHG

## 2024-08-12 DIAGNOSIS — R05.9 COUGH, UNSPECIFIED: ICD-10-CM

## 2024-08-12 PROCEDURE — 99215 OFFICE O/P EST HI 40 MIN: CPT

## 2024-08-12 PROCEDURE — G2211 COMPLEX E/M VISIT ADD ON: CPT

## 2024-08-12 RX ORDER — BENZONATATE 100 MG/1
100 CAPSULE ORAL 3 TIMES DAILY
Qty: 90 | Refills: 0 | Status: ACTIVE | COMMUNITY
Start: 2024-08-12 | End: 1900-01-01

## 2024-08-13 NOTE — HISTORY OF PRESENT ILLNESS
[de-identified] : Ms. Han is a 73-year-old female with metastatic adenocarcinoma of the lung, diagnosed in 9/2022. Here for second opinion.  Initially presented in 9/11/22 for c/o right sided neck pain that started after lifting a box and was admitted at [facility].  CT Cervical Spine (09/19/22) revealed: No acute cervical fracture or traumatic malalignment. Multilevel cervical spondylosis.  CT neck (09/19/22) revealed right internal jugular vein thrombosis, numerous enlarged mediastinal lymph nodes some demonstrating central necrosis, and a spiculated 2.1 x 1.6 cm mass within the right upper lobe concerning for pulmonary neoplasm.  CT C/A/P (09/20/22) revealed: 2.8 cm right upper lobe spiculated lesion extending to pleural surface high suspicious for bronchogenic neoplasm. Few scattered small nodules related to 3 mm too small to characterize. Extensive mediastinal necrotic lymphadenopathy. Mild right hilar lymphadenopathy. Small right pleural effusion. Subcentimeter right hepatic lobe hypodensity too small to characterize. No abdominal or pelvic lymphadenopathy. No bowel obstruction or ascites.  CT Guided RUL nodule biopsy (09/21/22)  ~ approximately 1.6x 1.9 cm spiculated lesion in the posterior right upper lobe.  Pathology: NSCLC, TTF-1 and PD-L1 (50-60%) Positive. P40, Synaptophysin, Chromogranin - negative.  Right internal jugular vein DVT on admission, she was started on Eliquis and is now on long-term Lovenox.  11/21/22 - CT A/P: left-sided colitis/proctitis. Possible hepatic flexure neoplasm. Small splenic infarct, moderate pleural effusion.   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   She is s/p Alimta carboplatin, now on Alimta maintenance, managed by Dr. [facility].  01/04/23 - Right VATS, drainage of effusion, TALC pleurodesis and intercostal nerve block with Dr. Sales. Pathology: histopathologic section showed cluster reactive mesothelial cells, histiocytes, and lymphocytes. Mesothelial cells positive for Calretinin and WT-1. Epithelial martkers TTF-1, JOSE EP4, and MOC-31 negative.  03/22/23 - Flex sigmoidoscopy. Colon biopsy (induced colitis) showed active colitis with cytitis. 06/10/23 - Patient had rectal bleeding was referred to GI (Dr. Kilgore) - still with active colitis, negative for dysplasia and granuloma.  05/22/23 - PET Scan ([facility]) revealed a 2.3 spiculated RUL nodule with increased size and activity from prior PET/CT. Hypermetabolic right hilar LN, slightly increased in activity. New multifocal hypermetabolic right pleural activity, consistent with pleural metastatic disease. Resolution of multifocal hypermetabolic cervical LN and extensive hypermetabolic mediastinal LN from prior PET CT. Resolution of multifocal hypermetabolic soft tissue nodule left deltoid. New small nonspecific focal mild activity right posterior larynx without obvious CT Correlate.  09/28/23 CT C/A/P at [facility] demonstrated a decrease in the RUL lesion. Right chest pleural thickening most prominent posterior medial mildly decreased. Subcentimeter right hilar LN decreased. No evidence of abdominal or pelvis metastatic disease.  01/25/24 CT C/A/P at [facility] revealed: 1. no interval change in the spiculated mass in the RUL or scattered pleural nodularity and right basilar airspace disease. No suspicious new nodules. 2. Interval increase in the right hilar adenopathy, slightly necrotic in appearance. 2. No intra-abdominal or pelvic lymphadenopathy or metastatic disease.  05/02/24 Last follow-up with Dr. Goldberg wherein she c/o dyspnea related to emphysema without pneumonitis.  05/13/24 CT C/A/P at [facility] revealed: 1. interval worsening of disease with large heterogeneous mass at the right hilum/medial aspect right lung significantly increased in size and a new 5mm nodular component at the inferior aspect of the elongated spiculated mass in the posterior RUL. 2. Hypodense focus in the right pulmonary vein to its junction with the right atrium concerning for tumor thrombus.  05/22/24 Seen by Rad Onc to evaluate for localized hilar RT  She reports that she is now off Alimta and will be switching to Taxotere.  She feels POLO and continues to have a chronic cough.    Pmhx: breast cancer, SCC, Bilateral jugular DVT, nephrolithiasis, Lyme Disease, Vit D Deficiency, depression.  Family history of lung cancer with both her father and mother.  [de-identified] : here s/p recent prolonged admission to [facility] for pneumonia Continues to feel significant fatigue but breathing and cough improved. Continues to have fevers but less frequently ~ .3 Discussed that she is due for Entyvio tomorrow but would like to reschedule

## 2024-08-13 NOTE — HISTORY OF PRESENT ILLNESS
[de-identified] : Ms. Han is a 73-year-old female with metastatic adenocarcinoma of the lung, diagnosed in 9/2022. Here for second opinion.  Initially presented in 9/11/22 for c/o right sided neck pain that started after lifting a box and was admitted at [facility].  CT Cervical Spine (09/19/22) revealed: No acute cervical fracture or traumatic malalignment. Multilevel cervical spondylosis.  CT neck (09/19/22) revealed right internal jugular vein thrombosis, numerous enlarged mediastinal lymph nodes some demonstrating central necrosis, and a spiculated 2.1 x 1.6 cm mass within the right upper lobe concerning for pulmonary neoplasm.  CT C/A/P (09/20/22) revealed: 2.8 cm right upper lobe spiculated lesion extending to pleural surface high suspicious for bronchogenic neoplasm. Few scattered small nodules related to 3 mm too small to characterize. Extensive mediastinal necrotic lymphadenopathy. Mild right hilar lymphadenopathy. Small right pleural effusion. Subcentimeter right hepatic lobe hypodensity too small to characterize. No abdominal or pelvic lymphadenopathy. No bowel obstruction or ascites.  CT Guided RUL nodule biopsy (09/21/22)  ~ approximately 1.6x 1.9 cm spiculated lesion in the posterior right upper lobe.  Pathology: NSCLC, TTF-1 and PD-L1 (50-60%) Positive. P40, Synaptophysin, Chromogranin - negative.  Right internal jugular vein DVT on admission, she was started on Eliquis and is now on long-term Lovenox.  11/21/22 - CT A/P: left-sided colitis/proctitis. Possible hepatic flexure neoplasm. Small splenic infarct, moderate pleural effusion.   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   She is s/p Alimta carboplatin, now on Alimta maintenance, managed by Dr. [facility].  01/04/23 - Right VATS, drainage of effusion, TALC pleurodesis and intercostal nerve block with Dr. Sales. Pathology: histopathologic section showed cluster reactive mesothelial cells, histiocytes, and lymphocytes. Mesothelial cells positive for Calretinin and WT-1. Epithelial martkers TTF-1, JOSE EP4, and MOC-31 negative.  03/22/23 - Flex sigmoidoscopy. Colon biopsy (induced colitis) showed active colitis with cytitis. 06/10/23 - Patient had rectal bleeding was referred to GI (Dr. Kilgore) - still with active colitis, negative for dysplasia and granuloma.  05/22/23 - PET Scan ([facility]) revealed a 2.3 spiculated RUL nodule with increased size and activity from prior PET/CT. Hypermetabolic right hilar LN, slightly increased in activity. New multifocal hypermetabolic right pleural activity, consistent with pleural metastatic disease. Resolution of multifocal hypermetabolic cervical LN and extensive hypermetabolic mediastinal LN from prior PET CT. Resolution of multifocal hypermetabolic soft tissue nodule left deltoid. New small nonspecific focal mild activity right posterior larynx without obvious CT Correlate.  09/28/23 CT C/A/P at [facility] demonstrated a decrease in the RUL lesion. Right chest pleural thickening most prominent posterior medial mildly decreased. Subcentimeter right hilar LN decreased. No evidence of abdominal or pelvis metastatic disease.  01/25/24 CT C/A/P at [facility] revealed: 1. no interval change in the spiculated mass in the RUL or scattered pleural nodularity and right basilar airspace disease. No suspicious new nodules. 2. Interval increase in the right hilar adenopathy, slightly necrotic in appearance. 2. No intra-abdominal or pelvic lymphadenopathy or metastatic disease.  05/02/24 Last follow-up with Dr. Goldberg wherein she c/o dyspnea related to emphysema without pneumonitis.  05/13/24 CT C/A/P at [facility] revealed: 1. interval worsening of disease with large heterogeneous mass at the right hilum/medial aspect right lung significantly increased in size and a new 5mm nodular component at the inferior aspect of the elongated spiculated mass in the posterior RUL. 2. Hypodense focus in the right pulmonary vein to its junction with the right atrium concerning for tumor thrombus.  05/22/24 Seen by Rad Onc to evaluate for localized hilar RT  She reports that she is now off Alimta and will be switching to Taxotere.  She feels POLO and continues to have a chronic cough.    Pmhx: breast cancer, SCC, Bilateral jugular DVT, nephrolithiasis, Lyme Disease, Vit D Deficiency, depression.  Family history of lung cancer with both her father and mother.  [de-identified] : here s/p recent prolonged admission to [facility] for pneumonia Continues to feel significant fatigue but breathing and cough improved. Continues to have fevers but less frequently ~ .3 Discussed that she is due for Entyvio tomorrow but would like to reschedule

## 2024-08-13 NOTE — ASSESSMENT
[FreeTextEntry1] : Reviewed diagnosis imaging and prior treatment plan with the patient and her  PDL1>50% started on Keytruda in 11/2022 however unfortunately complicated by steroid refractory colitis. Currently on Entyvio Patient most recently on carbo/Alimta with Alimta maintenance now with POD primarily in right hilar region. 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   7/16/24: C1 Taxotere   Patient discharged 8/5/2024 from [facility] for PNA and neutropenia.  Consideration for radiation pneumonitis however improved with abx. Now completed abx.    CT C/A/P 8/2/24 *  No pulmonary embolism. *  0.8 cm filling defect in the posterior of the right superior pulmonary vein (2-144), previously 1.7 cm. This likely represents tumor thrombus showing interval response to therapy. *  Stable 1.5 cm spiculated opacity in the right lung apex with pleural tethering.  *  Persistent patchy consolidation in the right lower lobe, which has become slightly more confluent. Persistent patchy opacities in the right middle and right upper lobe. New focal consolidation in the posterior right upper lobe. Correlate for pneumonia versus radiation changes. *  No acute pathology or metastatic disease in the abdomen.    Plan: >CBC, CMP at every visit.  CEA added monthly >will hold Cycle 2 at this time until fully recuperated. Will plan dose reduction when restart. Will re-eval in 2 weeks >follow up with pulmonary next week >follow up with Rad Onc for hilar RT >continue Lovenox at 80 mg daily >will reach out to Dr. Dasilva to discuss Entyvio at patient's request >Dexa scan for osteoporosis hx   RTC in 2 weeks

## 2024-08-13 NOTE — REVIEW OF SYSTEMS
[Fever] : fever [Cough] : no cough [Suicidal] : not suicidal [Insomnia] : no insomnia [FreeTextEntry2] : low grade .3

## 2024-08-13 NOTE — ASSESSMENT
[FreeTextEntry1] : Reviewed diagnosis imaging and prior treatment plan with the patient and her  PDL1>50% started on Keytruda in 11/2022 however unfortunately complicated by steroid refractory colitis. Currently on Entyvio Patient most recently on carbo/Alimta with Alimta maintenance now with POD primarily in right hilar region. 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   7/16/24: C1 Taxotere   Patient discharged 8/5/2024 from [facility] for PNA and neutropenia.  Consideration for radiation pneumonitis however improved with abx. Now completed abx.    CT C/A/P 8/2/24 *  No pulmonary embolism. *  0.8 cm filling defect in the posterior of the right superior pulmonary vein (2-144), previously 1.7 cm. This likely represents tumor thrombus showing interval response to therapy. *  Stable 1.5 cm spiculated opacity in the right lung apex with pleural tethering.  *  Persistent patchy consolidation in the right lower lobe, which has become slightly more confluent. Persistent patchy opacities in the right middle and right upper lobe. New focal consolidation in the posterior right upper lobe. Correlate for pneumonia versus radiation changes. *  No acute pathology or metastatic disease in the abdomen.    Plan: >CBC, CMP at every visit.  CEA added monthly >will hold Cycle 2 at this time until fully recuperated. Will plan dose reduction when restart. Will re-eval in 2 weeks >follow up with pulmonary next week >follow up with Rad Onc for hilar RT >continue Lovenox at 80 mg daily >will reach out to Dr. Dasilva to discuss Entyvio at patient's request >Dexa scan for osteoporosis hx   RTC in 2 weeks

## 2024-08-26 ENCOUNTER — RESULT REVIEW (OUTPATIENT)
Age: 74
End: 2024-08-26

## 2024-08-26 ENCOUNTER — LABORATORY RESULT (OUTPATIENT)
Age: 74
End: 2024-08-26

## 2024-08-26 ENCOUNTER — APPOINTMENT (OUTPATIENT)
Dept: HEMATOLOGY ONCOLOGY | Facility: CLINIC | Age: 74
End: 2024-08-26
Payer: MEDICARE

## 2024-08-26 VITALS
WEIGHT: 120.5 LBS | SYSTOLIC BLOOD PRESSURE: 114 MMHG | RESPIRATION RATE: 16 BRPM | HEIGHT: 65 IN | TEMPERATURE: 97.7 F | HEART RATE: 112 BPM | OXYGEN SATURATION: 96 % | DIASTOLIC BLOOD PRESSURE: 74 MMHG | BODY MASS INDEX: 20.08 KG/M2

## 2024-08-26 DIAGNOSIS — C34.90 MALIGNANT NEOPLASM OF UNSPECIFIED PART OF UNSPECIFIED BRONCHUS OR LUNG: ICD-10-CM

## 2024-08-26 DIAGNOSIS — R05.9 COUGH, UNSPECIFIED: ICD-10-CM

## 2024-08-26 PROCEDURE — 99215 OFFICE O/P EST HI 40 MIN: CPT

## 2024-08-26 PROCEDURE — G2211 COMPLEX E/M VISIT ADD ON: CPT

## 2024-08-27 ENCOUNTER — RESULT REVIEW (OUTPATIENT)
Age: 74
End: 2024-08-27

## 2024-08-27 DIAGNOSIS — N30.00 ACUTE CYSTITIS W/OUT HEMATURIA: ICD-10-CM

## 2024-08-27 RX ORDER — LEVOFLOXACIN 750 MG/1
750 TABLET, FILM COATED ORAL DAILY
Qty: 3 | Refills: 0 | Status: DISCONTINUED | COMMUNITY
Start: 2024-08-27 | End: 2024-08-27

## 2024-08-27 RX ORDER — LEVOFLOXACIN 250 MG/1
250 TABLET, FILM COATED ORAL DAILY
Qty: 3 | Refills: 0 | Status: ACTIVE | COMMUNITY
Start: 2024-08-27 | End: 1900-01-01

## 2024-08-27 NOTE — ASSESSMENT
[FreeTextEntry1] : Reviewed diagnosis imaging and prior treatment plan with the patient and her  PDL1>50% started on Keytruda in 11/2022 however unfortunately complicated by steroid refractory colitis. Currently on Entyvio Patient most recently on carbo/Alimta with Alimta maintenance now with POD primarily in right hilar region. 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   7/16/24: C1 Taxotere  CT C/A/P 8/2/24 *  No pulmonary embolism. *  0.8 cm filling defect in the posterior of the right superior pulmonary vein (2-144), previously 1.7 cm. This likely represents tumor thrombus showing interval response to therapy. *  Stable 1.5 cm spiculated opacity in the right lung apex with pleural tethering.  *  Persistent patchy consolidation in the right lower lobe, which has become slightly more confluent. Persistent patchy opacities in the right middle and right upper lobe. New focal consolidation in the posterior right upper lobe. Correlate for pneumonia versus radiation changes. *  No acute pathology or metastatic disease in the abdomen.    Patient discharged 8/5/2024 from [facility] for PNA and neutropenia.  Consideration for radiation pneumonitis however improved with abx. Now completed abx.    Plan: >CBC, CMP at every visit.  CEA added monthly >will hold Cycle 2 until nexy week. Plan to resume Taxotere at 50% for the next cycle >follow up with pulmonary  >follow up with Rad Onc for hilar RT >continue Lovenox at 80 mg daily >holding Entyvio after discussion with Dr. Dasilva and patient's preference. Will continue to monitor >Dexa scan for osteoporosis hx   RTC in 1 week for treatment

## 2024-08-27 NOTE — ASSESSMENT
[FreeTextEntry1] : Reviewed diagnosis imaging and prior treatment plan with the patient and her  PDL1>50% started on Keytruda in 11/2022 however unfortunately complicated by steroid refractory colitis. Currently on Entyvio Patient most recently on carbo/Alimta with Alimta maintenance now with POD primarily in right hilar region. 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   7/16/24: C1 Taxotere  CT C/A/P 8/2/24 *  No pulmonary embolism. *  0.8 cm filling defect in the posterior of the right superior pulmonary vein (2-144), previously 1.7 cm. This likely represents tumor thrombus showing interval response to therapy. *  Stable 1.5 cm spiculated opacity in the right lung apex with pleural tethering.  *  Persistent patchy consolidation in the right lower lobe, which has become slightly more confluent. Persistent patchy opacities in the right middle and right upper lobe. New focal consolidation in the posterior right upper lobe. Correlate for pneumonia versus radiation changes. *  No acute pathology or metastatic disease in the abdomen.    Patient discharged 8/5/2024 from [facility] for PNA and neutropenia.  Consideration for radiation pneumonitis however improved with abx. Now completed abx.    Plan: >CBC, CMP at every visit.  CEA added monthly >will hold Cycle 2 until nexy week. Plan to resume Taxotere at 50% for the next cycle >follow up with pulmonary  >follow up with Rad Onc for hilar RT >continue Lovenox at 80 mg daily >holding Entyvio after discussion with Dr. Dasilva and patient's preference. Will continue to monitor >Dexa scan for osteoporosis hx   RTC in 1 week for treatment

## 2024-08-27 NOTE — HISTORY OF PRESENT ILLNESS
[de-identified] : Ms. Han is a 73-year-old female with metastatic adenocarcinoma of the lung, diagnosed in 9/2022. Here for second opinion.  Initially presented in 9/11/22 for c/o right sided neck pain that started after lifting a box and was admitted at [facility] [place].  CT Cervical Spine (09/19/22) revealed: No acute cervical fracture or traumatic malalignment. Multilevel cervical spondylosis.  CT neck (09/19/22) revealed right internal jugular vein thrombosis, numerous enlarged mediastinal lymph nodes some demonstrating central necrosis, and a spiculated 2.1 x 1.6 cm mass within the right upper lobe concerning for pulmonary neoplasm.  CT C/A/P (09/20/22) revealed: 2.8 cm right upper lobe spiculated lesion extending to pleural surface high suspicious for bronchogenic neoplasm. Few scattered small nodules related to 3 mm too small to characterize. Extensive mediastinal necrotic lymphadenopathy. Mild right hilar lymphadenopathy. Small right pleural effusion. Subcentimeter right hepatic lobe hypodensity too small to characterize. No abdominal or pelvic lymphadenopathy. No bowel obstruction or ascites.  CT Guided RUL nodule biopsy (09/21/22)  ~ approximately 1.6x 1.9 cm spiculated lesion in the posterior right upper lobe.  Pathology: NSCLC, TTF-1 and PD-L1 (50-60%) Positive. P40, Synaptophysin, Chromogranin - negative.  Right internal jugular vein DVT on admission, she was started on Eliquis and is now on long-term Lovenox.  11/21/22 - CT A/P: left-sided colitis/proctitis. Possible hepatic flexure neoplasm. Small splenic infarct, moderate pleural effusion.   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   She is s/p Alimta carboplatin, now on Alimta maintenance, managed by Dr. Goldberg[facility].  01/04/23 - Right VATS, drainage of effusion, TALC pleurodesis and intercostal nerve block with Dr. Sales. Pathology: histopathologic section showed cluster reactive mesothelial cells, histiocytes, and lymphocytes. Mesothelial cells positive for Calretinin and WT-1. Epithelial martkers TTF-1, JOSE EP4, and MOC-31 negative.  03/22/23 - Flex sigmoidoscopy. Colon biopsy (induced colitis) showed active colitis with cytitis. 06/10/23 - Patient had rectal bleeding was referred to GI (Dr. Kilgore) - still with active colitis, negative for dysplasia and granuloma.  05/22/23 - PET Scan ([facility]) revealed a 2.3 spiculated RUL nodule with increased size and activity from prior PET/CT. Hypermetabolic right hilar LN, slightly increased in activity. New multifocal hypermetabolic right pleural activity, consistent with pleural metastatic disease. Resolution of multifocal hypermetabolic cervical LN and extensive hypermetabolic mediastinal LN from prior PET CT. Resolution of multifocal hypermetabolic soft tissue nodule left deltoid. New small nonspecific focal mild activity right posterior larynx without obvious CT Correlate.  09/28/23 CT C/A/P at [facility] demonstrated a decrease in the RUL lesion. Right chest pleural thickening most prominent posterior medial mildly decreased. Subcentimeter right hilar LN decreased. No evidence of abdominal or pelvis metastatic disease.  01/25/24 CT C/A/P at [facility] revealed: 1. no interval change in the spiculated mass in the RUL or scattered pleural nodularity and right basilar airspace disease. No suspicious new nodules. 2. Interval increase in the right hilar adenopathy, slightly necrotic in appearance. 2. No intra-abdominal or pelvic lymphadenopathy or metastatic disease.  05/02/24 Last follow-up with Dr. Goldberg wherein she c/o dyspnea related to emphysema without pneumonitis.  05/13/24 CT C/A/P at [facility] revealed: 1. interval worsening of disease with large heterogeneous mass at the right hilum/medial aspect right lung significantly increased in size and a new 5mm nodular component at the inferior aspect of the elongated spiculated mass in the posterior RUL. 2. Hypodense focus in the right pulmonary vein to its junction with the right atrium concerning for tumor thrombus.  05/22/24 Seen by Rad Onc to evaluate for localized hilar RT  She reports that she is now off Alimta and will be switching to Taxotere.  She feels POLO and continues to have a chronic cough.    Pmhx: breast cancer, SCC, Bilateral jugular DVT, nephrolithiasis, Lyme Disease, Vit D Deficiency, depression.  Family history of lung cancer with both her father and mother.  [de-identified] : Continues to feel significant fatigue but breathing and cough improved since being discharged on 8/6 HAs not had low grade fevers X 5 days Entyvio on hold given recent PNA. Also after discussion with Dr. Sullivan, given no recurrence of colitis and per patient's preference, will hold Entyvio and monitor Patient due to resume chemo today however would like to postpone X 1 week

## 2024-08-27 NOTE — REVIEW OF SYSTEMS
[Cough] : no cough [Suicidal] : not suicidal [Insomnia] : no insomnia [FreeTextEntry2] : low grade .3

## 2024-08-27 NOTE — HISTORY OF PRESENT ILLNESS
[de-identified] : Ms. Han is a 73-year-old female with metastatic adenocarcinoma of the lung, diagnosed in 9/2022. Here for second opinion.  Initially presented in 9/11/22 for c/o right sided neck pain that started after lifting a box and was admitted at [facility].  CT Cervical Spine (09/19/22) revealed: No acute cervical fracture or traumatic malalignment. Multilevel cervical spondylosis.  CT neck (09/19/22) revealed right internal jugular vein thrombosis, numerous enlarged mediastinal lymph nodes some demonstrating central necrosis, and a spiculated 2.1 x 1.6 cm mass within the right upper lobe concerning for pulmonary neoplasm.  CT C/A/P (09/20/22) revealed: 2.8 cm right upper lobe spiculated lesion extending to pleural surface high suspicious for bronchogenic neoplasm. Few scattered small nodules related to 3 mm too small to characterize. Extensive mediastinal necrotic lymphadenopathy. Mild right hilar lymphadenopathy. Small right pleural effusion. Subcentimeter right hepatic lobe hypodensity too small to characterize. No abdominal or pelvic lymphadenopathy. No bowel obstruction or ascites.  CT Guided RUL nodule biopsy (09/21/22)  ~ approximately 1.6x 1.9 cm spiculated lesion in the posterior right upper lobe.  Pathology: NSCLC, TTF-1 and PD-L1 (50-60%) Positive. P40, Synaptophysin, Chromogranin - negative.  Right internal jugular vein DVT on admission, she was started on Eliquis and is now on long-term Lovenox.  11/21/22 - CT A/P: left-sided colitis/proctitis. Possible hepatic flexure neoplasm. Small splenic infarct, moderate pleural effusion.   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   She is s/p Alimta carboplatin, now on Alimta maintenance, managed by Dr. [facility].  01/04/23 - Right VATS, drainage of effusion, TALC pleurodesis and intercostal nerve block with Dr. Sales. Pathology: histopathologic section showed cluster reactive mesothelial cells, histiocytes, and lymphocytes. Mesothelial cells positive for Calretinin and WT-1. Epithelial martkers TTF-1, JOSE EP4, and MOC-31 negative.  03/22/23 - Flex sigmoidoscopy. Colon biopsy (induced colitis) showed active colitis with cytitis. 06/10/23 - Patient had rectal bleeding was referred to GI (Dr. Kilgore) - still with active colitis, negative for dysplasia and granuloma.  05/22/23 - PET Scan ([facility]) revealed a 2.3 spiculated RUL nodule with increased size and activity from prior PET/CT. Hypermetabolic right hilar LN, slightly increased in activity. New multifocal hypermetabolic right pleural activity, consistent with pleural metastatic disease. Resolution of multifocal hypermetabolic cervical LN and extensive hypermetabolic mediastinal LN from prior PET CT. Resolution of multifocal hypermetabolic soft tissue nodule left deltoid. New small nonspecific focal mild activity right posterior larynx without obvious CT Correlate.  09/28/23 CT C/A/P at [facility] demonstrated a decrease in the RUL lesion. Right chest pleural thickening most prominent posterior medial mildly decreased. Subcentimeter right hilar LN decreased. No evidence of abdominal or pelvis metastatic disease.  01/25/24 CT C/A/P at [facility] revealed: 1. no interval change in the spiculated mass in the RUL or scattered pleural nodularity and right basilar airspace disease. No suspicious new nodules. 2. Interval increase in the right hilar adenopathy, slightly necrotic in appearance. 2. No intra-abdominal or pelvic lymphadenopathy or metastatic disease.  05/02/24 Last follow-up with Dr. Goldberg wherein she c/o dyspnea related to emphysema without pneumonitis.  05/13/24 CT C/A/P at [facility] revealed: 1. interval worsening of disease with large heterogeneous mass at the right hilum/medial aspect right lung significantly increased in size and a new 5mm nodular component at the inferior aspect of the elongated spiculated mass in the posterior RUL. 2. Hypodense focus in the right pulmonary vein to its junction with the right atrium concerning for tumor thrombus.  05/22/24 Seen by Rad Onc to evaluate for localized hilar RT  She reports that she is now off Alimta and will be switching to Taxotere.  She feels POLO and continues to have a chronic cough.    Pmhx: breast cancer, SCC, Bilateral jugular DVT, nephrolithiasis, Lyme Disease, Vit D Deficiency, depression.  Family history of lung cancer with both her father and mother.  [de-identified] : Continues to feel significant fatigue but breathing and cough improved since being discharged on 8/6 HAs not had low grade fevers X 5 days Entyvio on hold given recent PNA. Also after discussion with Dr. Sullivan, given no recurrence of colitis and per patient's preference, will hold Entyvio and monitor Patient due to resume chemo today however would like to postpone X 1 week

## 2024-08-29 ENCOUNTER — RESULT REVIEW (OUTPATIENT)
Age: 74
End: 2024-08-29

## 2024-08-31 ENCOUNTER — TRANSCRIPTION ENCOUNTER (OUTPATIENT)
Age: 74
End: 2024-08-31

## 2024-09-10 ENCOUNTER — APPOINTMENT (OUTPATIENT)
Dept: HEMATOLOGY ONCOLOGY | Facility: CLINIC | Age: 74
End: 2024-09-10
Payer: MEDICARE

## 2024-09-10 ENCOUNTER — RESULT REVIEW (OUTPATIENT)
Age: 74
End: 2024-09-10

## 2024-09-10 VITALS
BODY MASS INDEX: 20.23 KG/M2 | WEIGHT: 121.4 LBS | RESPIRATION RATE: 16 BRPM | TEMPERATURE: 98.6 F | OXYGEN SATURATION: 92 % | DIASTOLIC BLOOD PRESSURE: 70 MMHG | HEART RATE: 111 BPM | HEIGHT: 65 IN | SYSTOLIC BLOOD PRESSURE: 116 MMHG

## 2024-09-10 DIAGNOSIS — C34.90 MALIGNANT NEOPLASM OF UNSPECIFIED PART OF UNSPECIFIED BRONCHUS OR LUNG: ICD-10-CM

## 2024-09-10 PROCEDURE — G2211 COMPLEX E/M VISIT ADD ON: CPT

## 2024-09-10 PROCEDURE — 99215 OFFICE O/P EST HI 40 MIN: CPT

## 2024-09-10 RX ORDER — SULFAMETHOXAZOLE AND TRIMETHOPRIM 800; 160 MG/1; MG/1
TABLET ORAL
Refills: 0 | Status: ACTIVE | COMMUNITY

## 2024-09-10 RX ORDER — ENOXAPARIN SODIUM 80 MG/.8ML
80 INJECTION SUBCUTANEOUS
Refills: 0 | Status: ACTIVE | COMMUNITY

## 2024-09-10 RX ORDER — PREDNISONE 10 MG
TABLET ORAL
Refills: 0 | Status: ACTIVE | COMMUNITY

## 2024-09-10 RX ORDER — PANTOPRAZOLE SODIUM 40 MG/10ML
40 INJECTION, POWDER, FOR SOLUTION INTRAVENOUS
Refills: 0 | Status: ACTIVE | COMMUNITY

## 2024-09-10 NOTE — BEGINNING OF VISIT
[0] : 2) Feeling down, depressed, or hopeless: Not at all (0) [PHQ-9 Deferred] : PHQ-9 Deferred [[id number]] : 0 [Pain Scale: ___] : On a scale of 1-10, today the patient's pain is a(n) [unfilled]. [Former] : Former [20 or more] : 20 or more [< 15 Years] : < 15 Years [Date Discussed (MM/DD/YY): ___] : Discussed: [unfilled] [Reviewed, no changes] : Reviewed, no changes [Abdominal Pain] : no abdominal pain [Vomiting] : no vomiting [Constipation] : no constipation [Diarrhea Character] : Diarrhea: Grade 0

## 2024-09-10 NOTE — BEGINNING OF VISIT
[0] : 2) Feeling down, depressed, or hopeless: Not at all (0) [PHQ-9 Deferred] : PHQ-9 Deferred [[id number]] : 0 [Pain Scale: ___] : On a scale of 1-10, today the patient's pain is a(n) [unfilled]. [Former] : Former [20 or more] : 20 or more [< 15 Years] : < 15 Years [Date Discussed (MM/DD/YY): ___] : Discussed: [unfilled] [Reviewed, no changes] : Reviewed, no changes [Abdominal Pain] : no abdominal pain [Vomiting] : no vomiting [Constipation] : no constipation [Diarrhea Character] : Diarrhea: Grade 0

## 2024-09-10 NOTE — BEGINNING OF VISIT
[0] : 2) Feeling down, depressed, or hopeless: Not at all (0) [PHQ-9 Deferred] : PHQ-9 Deferred [[id number]] : 0 [Pain Scale: ___] : On a scale of 1-10, today the patient's pain is a(n) [unfilled]. [Former] : Former [20 or more] : 20 or more [< 15 Years] : < 15 Years [Date Discussed (MM/DD/YY): ___] : Discussed: [unfilled] [Reviewed, no changes] : Reviewed, no changes [Abdominal Pain] : no abdominal pain [Vomiting] : no vomiting [Constipation] : no constipation [Diarrhea Character] : Diarrhea: Grade 0

## 2024-09-10 NOTE — BEGINNING OF VISIT
[0] : 2) Feeling down, depressed, or hopeless: Not at all (0) [PHQ-9 Deferred] : PHQ-9 Deferred [[id number]] : 0 [Pain Scale: ___] : On a scale of 1-10, today the patient's pain is a(n) [unfilled]. [Former] : Former [20 or more] : 20 or more [< 15 Years] : < 15 Years [Date Discussed (MM/DD/YY): ___] : Discussed: [unfilled] [Reviewed, no changes] : Reviewed, no changes [Abdominal Pain] : no abdominal pain [Vomiting] : no vomiting [Constipation] : no constipation [Diarrhea Character] : Diarrhea: Grade 0

## 2024-09-11 NOTE — HISTORY OF PRESENT ILLNESS
[de-identified] : Ms. Han is a 73-year-old female with metastatic adenocarcinoma of the lung, diagnosed in 9/2022. Here for second opinion.  Initially presented in 9/11/22 for c/o right sided neck pain that started after lifting a box and was admitted at [facility].  CT Cervical Spine (09/19/22) revealed: No acute cervical fracture or traumatic malalignment. Multilevel cervical spondylosis.  CT neck (09/19/22) revealed right internal jugular vein thrombosis, numerous enlarged mediastinal lymph nodes some demonstrating central necrosis, and a spiculated 2.1 x 1.6 cm mass within the right upper lobe concerning for pulmonary neoplasm.  CT C/A/P (09/20/22) revealed: 2.8 cm right upper lobe spiculated lesion extending to pleural surface high suspicious for bronchogenic neoplasm. Few scattered small nodules related to 3 mm too small to characterize. Extensive mediastinal necrotic lymphadenopathy. Mild right hilar lymphadenopathy. Small right pleural effusion. Subcentimeter right hepatic lobe hypodensity too small to characterize. No abdominal or pelvic lymphadenopathy. No bowel obstruction or ascites.  CT Guided RUL nodule biopsy (09/21/22)  ~ approximately 1.6x 1.9 cm spiculated lesion in the posterior right upper lobe.  Pathology: NSCLC, TTF-1 and PD-L1 (50-60%) Positive. P40, Synaptophysin, Chromogranin - negative.  Right internal jugular vein DVT on admission, she was started on Eliquis and is now on long-term Lovenox.  11/21/22 - CT A/P: left-sided colitis/proctitis. Possible hepatic flexure neoplasm. Small splenic infarct, moderate pleural effusion.   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   She is s/p Alimta carboplatin, now on Alimta maintenance, managed by Dr. Goldberg[facility].  01/04/23 - Right VATS, drainage of effusion, TALC pleurodesis and intercostal nerve block with Dr. Sales. Pathology: histopathologic section showed cluster reactive mesothelial cells, histiocytes, and lymphocytes. Mesothelial cells positive for Calretinin and WT-1. Epithelial martkers TTF-1, JOSE EP4, and MOC-31 negative.  03/22/23 - Flex sigmoidoscopy. Colon biopsy (induced colitis) showed active colitis with cytitis. 06/10/23 - Patient had rectal bleeding was referred to GI (Dr. Kilgore) - still with active colitis, negative for dysplasia and granuloma.  05/22/23 - PET Scan ([facility]) revealed a 2.3 spiculated RUL nodule with increased size and activity from prior PET/CT. Hypermetabolic right hilar LN, slightly increased in activity. New multifocal hypermetabolic right pleural activity, consistent with pleural metastatic disease. Resolution of multifocal hypermetabolic cervical LN and extensive hypermetabolic mediastinal LN from prior PET CT. Resolution of multifocal hypermetabolic soft tissue nodule left deltoid. New small nonspecific focal mild activity right posterior larynx without obvious CT Correlate.  09/28/23 CT C/A/P at [facility] demonstrated a decrease in the RUL lesion. Right chest pleural thickening most prominent posterior medial mildly decreased. Subcentimeter right hilar LN decreased. No evidence of abdominal or pelvis metastatic disease.  01/25/24 CT C/A/P at [facility] revealed: 1. no interval change in the spiculated mass in the RUL or scattered pleural nodularity and right basilar airspace disease. No suspicious new nodules. 2. Interval increase in the right hilar adenopathy, slightly necrotic in appearance. 2. No intra-abdominal or pelvic lymphadenopathy or metastatic disease.  05/02/24 Last follow-up with Dr. Goldberg wherein she c/o dyspnea related to emphysema without pneumonitis.  05/13/24 CT C/A/P at [facility] revealed: 1. interval worsening of disease with large heterogeneous mass at the right hilum/medial aspect right lung significantly increased in size and a new 5mm nodular component at the inferior aspect of the elongated spiculated mass in the posterior RUL. 2. Hypodense focus in the right pulmonary vein to its junction with the right atrium concerning for tumor thrombus.  05/22/24 Seen by Rad Onc to evaluate for localized hilar RT  She reports that she is now off Alimta and will be switching to Taxotere.  She feels POLO and continues to have a chronic cough.    Pmhx: breast cancer, SCC, Bilateral jugular DVT, nephrolithiasis, Lyme Disease, Vit D Deficiency, depression.  Family history of lung cancer with both her father and mother.  [de-identified] : Pulmonary doctor next on the 16th, recommendation not to have chemo until current episode is resolved Pulmonologist felt pneumonia was radiation induced. Patient has not found a primary doctor as yet Frequent tickling type cough feels he has allergies

## 2024-09-11 NOTE — ASSESSMENT
[FreeTextEntry1] : Reviewed diagnosis imaging and prior treatment plan with the patient and her  PDL1>50% started on Keytruda in 11/2022 however unfortunately complicated by steroid refractory colitis. Currently on Entyvio Patient most recently on carbo/Alimta with Alimta maintenance now with POD primarily in right hilar region. 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   7/16/24: C1 Taxotere  CT C/A/P 8/2/24 *  No pulmonary embolism. *  0.8 cm filling defect in the posterior of the right superior pulmonary vein (2-144), previously 1.7 cm. This likely represents tumor thrombus showing interval response to therapy. *  Stable 1.5 cm spiculated opacity in the right lung apex with pleural tethering.  *  Persistent patchy consolidation in the right lower lobe, which has become slightly more confluent. Persistent patchy opacities in the right middle and right upper lobe. New focal consolidation in the posterior right upper lobe. Correlate for pneumonia versus radiation changes. *  No acute pathology or metastatic disease in the abdomen.  Patient discharged 8/5/2024 from [facility] for PNA and neutropenia.  Consideration for radiation pneumonitis however improved with abx. Now completed abx.   BAL 8/29/24: reactive benign cells  RLL transbronchial bx 8/29/24:  A. RIGHT LOWER LOBE LUNG, BIOPSY - Organizing pneumonia - Atypical pneumoyctes, with prominent nucleoli and mild nuclear hyperchromasia, highlighted by TTF1 and cytokeratin 7 stains, favored to be reactive - A stain for p40 supports this diagnosis - Deeper levels were examined  Admitted to [facility] for bronchoscopy BAL negative Evidence of organizing PNA  Plan: >CBC, CMP at every visit.  CEA added monthly >will hold Cycle 2 until cleared by Pulmonary. Currently on steroid taper >follow up with pulmonary  >follow up with Rad Onc for hilar RT >continue Lovenox at 80 mg daily >holding Entyvio after discussion with Dr. Dasilva and patient's preference. Will continue to monitor >Dexa scan for osteoporosis hx   RTC in 2 weeks

## 2024-09-11 NOTE — REVIEW OF SYSTEMS
[Fatigue] : fatigue [Nosebleeds] : nosebleeds [Shortness Of Breath] : shortness of breath [Wheezing] : wheezing [Cough] : cough [Diarrhea: Grade 0] : Diarrhea: Grade 0 [Incontinence] : incontinence [Easy Bleeding] : a tendency for easy bleeding [Negative] : Eyes [Fever] : no fever [Chills] : no chills [Night Sweats] : no night sweats [Recent Change In Weight] : ~T no recent weight change [Dysphagia] : no dysphagia [Loss of Hearing] : no loss of hearing [Hoarseness] : no hoarseness [Odynophagia] : no odynophagia [Mucosal Pain] : no mucosal pain [Abdominal Pain] : no abdominal pain [Vomiting] : no vomiting [Constipation] : no constipation [Dizziness] : no dizziness [Suicidal] : not suicidal [Insomnia] : no insomnia [Proptosis] : no proptosis [Hot Flashes] : no hot flashes [Muscle Weakness] : no muscle weakness [Deepening Of The Voice] : no deepening of the voice [FreeTextEntry2] : No weight changes  [FreeTextEntry4] : last nosebleed was 6 months ago,managed at home [FreeTextEntry5] : dizzyness on standing drom sitting gardening [FreeTextEntry6] : Pulse oximeter 94 to 96 without oxygen,has oxygen at home [FreeTextEntry8] : at night stress with coughing [de-identified] : some brusing from lovenox [de-identified] : lovenox

## 2024-09-11 NOTE — REVIEW OF SYSTEMS
[Fatigue] : fatigue [Nosebleeds] : nosebleeds [Shortness Of Breath] : shortness of breath [Wheezing] : wheezing [Cough] : cough [Diarrhea: Grade 0] : Diarrhea: Grade 0 [Incontinence] : incontinence [Easy Bleeding] : a tendency for easy bleeding [Negative] : Eyes [Fever] : no fever [Chills] : no chills [Night Sweats] : no night sweats [Recent Change In Weight] : ~T no recent weight change [Dysphagia] : no dysphagia [Loss of Hearing] : no loss of hearing [Hoarseness] : no hoarseness [Odynophagia] : no odynophagia [Mucosal Pain] : no mucosal pain [Abdominal Pain] : no abdominal pain [Vomiting] : no vomiting [Constipation] : no constipation [Dizziness] : no dizziness [Suicidal] : not suicidal [Insomnia] : no insomnia [Proptosis] : no proptosis [Hot Flashes] : no hot flashes [Muscle Weakness] : no muscle weakness [Deepening Of The Voice] : no deepening of the voice [FreeTextEntry2] : No weight changes  [FreeTextEntry4] : last nosebleed was 6 months ago,managed at home [FreeTextEntry5] : dizzyness on standing drom sitting gardening [FreeTextEntry6] : Pulse oximeter 94 to 96 without oxygen,has oxygen at home [FreeTextEntry8] : at night stress with coughing [de-identified] : some brusing from lovenox [de-identified] : lovenox

## 2024-09-11 NOTE — ASSESSMENT
[FreeTextEntry1] : Reviewed diagnosis imaging and prior treatment plan with the patient and her  PDL1>50% started on Keytruda in 11/2022 however unfortunately complicated by steroid refractory colitis. Currently on Entyvio Patient most recently on carbo/Alimta with Alimta maintenance now with POD primarily in right hilar region. 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   7/16/24: C1 Taxotere  CT C/A/P 8/2/24 *  No pulmonary embolism. *  0.8 cm filling defect in the posterior of the right superior pulmonary vein (2-144), previously 1.7 cm. This likely represents tumor thrombus showing interval response to therapy. *  Stable 1.5 cm spiculated opacity in the right lung apex with pleural tethering.  *  Persistent patchy consolidation in the right lower lobe, which has become slightly more confluent. Persistent patchy opacities in the right middle and right upper lobe. New focal consolidation in the posterior right upper lobe. Correlate for pneumonia versus radiation changes. *  No acute pathology or metastatic disease in the abdomen.  Patient discharged 8/5/2024 from [facility] for PNA and neutropenia.  Consideration for radiation pneumonitis however improved with abx. Now completed abx.   BAL 8/29/24: reactive benign cells  RLL transbronchial bx 8/29/24:  A. RIGHT LOWER LOBE LUNG, BIOPSY - Organizing pneumonia - Atypical pneumoyctes, with prominent nucleoli and mild nuclear hyperchromasia, highlighted by TTF1 and cytokeratin 7 stains, favored to be reactive - A stain for p40 supports this diagnosis - Deeper levels were examined  Admitted to [facility] for bronchoscopy BAL negative Evidence of organizing PNA  Plan: >CBC, CMP at every visit.  CEA added monthly >will hold Cycle 2 until cleared by Pulmonary. Currently on steroid taper >follow up with pulmonary  >follow up with Rad Onc for hilar RT >continue Lovenox at 80 mg daily >holding Entyvio after discussion with Dr. Dasilva and patient's preference. Will continue to monitor >Dexa scan for osteoporosis hx   RTC in 2 weeks

## 2024-09-11 NOTE — HISTORY OF PRESENT ILLNESS
[de-identified] : Ms. Han is a 73-year-old female with metastatic adenocarcinoma of the lung, diagnosed in 9/2022. Here for second opinion.  Initially presented in 9/11/22 for c/o right sided neck pain that started after lifting a box and was admitted at [facility].  CT Cervical Spine (09/19/22) revealed: No acute cervical fracture or traumatic malalignment. Multilevel cervical spondylosis.  CT neck (09/19/22) revealed right internal jugular vein thrombosis, numerous enlarged mediastinal lymph nodes some demonstrating central necrosis, and a spiculated 2.1 x 1.6 cm mass within the right upper lobe concerning for pulmonary neoplasm.  CT C/A/P (09/20/22) revealed: 2.8 cm right upper lobe spiculated lesion extending to pleural surface high suspicious for bronchogenic neoplasm. Few scattered small nodules related to 3 mm too small to characterize. Extensive mediastinal necrotic lymphadenopathy. Mild right hilar lymphadenopathy. Small right pleural effusion. Subcentimeter right hepatic lobe hypodensity too small to characterize. No abdominal or pelvic lymphadenopathy. No bowel obstruction or ascites.  CT Guided RUL nodule biopsy (09/21/22)  ~ approximately 1.6x 1.9 cm spiculated lesion in the posterior right upper lobe.  Pathology: NSCLC, TTF-1 and PD-L1 (50-60%) Positive. P40, Synaptophysin, Chromogranin - negative.  Right internal jugular vein DVT on admission, she was started on Eliquis and is now on long-term Lovenox.  11/21/22 - CT A/P: left-sided colitis/proctitis. Possible hepatic flexure neoplasm. Small splenic infarct, moderate pleural effusion.   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   She is s/p Alimta carboplatin, now on Alimta maintenance, managed by Dr. [facility].  01/04/23 - Right VATS, drainage of effusion, TALC pleurodesis and intercostal nerve block with Dr. Sales. Pathology: histopathologic section showed cluster reactive mesothelial cells, histiocytes, and lymphocytes. Mesothelial cells positive for Calretinin and WT-1. Epithelial martkers TTF-1, JOSE EP4, and MOC-31 negative.  03/22/23 - Flex sigmoidoscopy. Colon biopsy (induced colitis) showed active colitis with cytitis. 06/10/23 - Patient had rectal bleeding was referred to GI (Dr. Kilgore) - still with active colitis, negative for dysplasia and granuloma.  05/22/23 - PET Scan ([facility]) revealed a 2.3 spiculated RUL nodule with increased size and activity from prior PET/CT. Hypermetabolic right hilar LN, slightly increased in activity. New multifocal hypermetabolic right pleural activity, consistent with pleural metastatic disease. Resolution of multifocal hypermetabolic cervical LN and extensive hypermetabolic mediastinal LN from prior PET CT. Resolution of multifocal hypermetabolic soft tissue nodule left deltoid. New small nonspecific focal mild activity right posterior larynx without obvious CT Correlate.  09/28/23 CT C/A/P at [facility] demonstrated a decrease in the RUL lesion. Right chest pleural thickening most prominent posterior medial mildly decreased. Subcentimeter right hilar LN decreased. No evidence of abdominal or pelvis metastatic disease.  01/25/24 CT C/A/P at [facility] revealed: 1. no interval change in the spiculated mass in the RUL or scattered pleural nodularity and right basilar airspace disease. No suspicious new nodules. 2. Interval increase in the right hilar adenopathy, slightly necrotic in appearance. 2. No intra-abdominal or pelvic lymphadenopathy or metastatic disease.  05/02/24 Last follow-up with Dr. Goldberg wherein she c/o dyspnea related to emphysema without pneumonitis.  05/13/24 CT C/A/P at [facility] revealed: 1. interval worsening of disease with large heterogeneous mass at the right hilum/medial aspect right lung significantly increased in size and a new 5mm nodular component at the inferior aspect of the elongated spiculated mass in the posterior RUL. 2. Hypodense focus in the right pulmonary vein to its junction with the right atrium concerning for tumor thrombus.  05/22/24 Seen by Rad Onc to evaluate for localized hilar RT  She reports that she is now off Alimta and will be switching to Taxotere.  She feels POLO and continues to have a chronic cough.    Pmhx: breast cancer, SCC, Bilateral jugular DVT, nephrolithiasis, Lyme Disease, Vit D Deficiency, depression.  Family history of lung cancer with both her father and mother.  [de-identified] : Pulmonary doctor next on the 16th, recommendation not to have chemo until current episode is resolved Pulmonologist felt pneumonia was radiation induced. Patient has not found a primary doctor as yet Frequent tickling type cough feels he has allergies

## 2024-09-11 NOTE — REVIEW OF SYSTEMS
[Fatigue] : fatigue [Nosebleeds] : nosebleeds [Shortness Of Breath] : shortness of breath [Wheezing] : wheezing [Cough] : cough [Diarrhea: Grade 0] : Diarrhea: Grade 0 [Incontinence] : incontinence [Easy Bleeding] : a tendency for easy bleeding [Negative] : Eyes [Fever] : no fever [Chills] : no chills [Night Sweats] : no night sweats [Recent Change In Weight] : ~T no recent weight change [Dysphagia] : no dysphagia [Loss of Hearing] : no loss of hearing [Hoarseness] : no hoarseness [Odynophagia] : no odynophagia [Mucosal Pain] : no mucosal pain [Abdominal Pain] : no abdominal pain [Vomiting] : no vomiting [Constipation] : no constipation [Dizziness] : no dizziness [Suicidal] : not suicidal [Insomnia] : no insomnia [Proptosis] : no proptosis [Hot Flashes] : no hot flashes [Muscle Weakness] : no muscle weakness [Deepening Of The Voice] : no deepening of the voice [FreeTextEntry2] : No weight changes  [FreeTextEntry4] : last nosebleed was 6 months ago,managed at home [FreeTextEntry5] : dizzyness on standing drom sitting gardening [FreeTextEntry6] : Pulse oximeter 94 to 96 without oxygen,has oxygen at home [FreeTextEntry8] : at night stress with coughing [de-identified] : some brusing from lovenox [de-identified] : lovenox

## 2024-09-11 NOTE — HISTORY OF PRESENT ILLNESS
[de-identified] : Ms. Han is a 73-year-old female with metastatic adenocarcinoma of the lung, diagnosed in 9/2022. Here for second opinion.  Initially presented in 9/11/22 for c/o right sided neck pain that started after lifting a box and was admitted at [facility].  CT Cervical Spine (09/19/22) revealed: No acute cervical fracture or traumatic malalignment. Multilevel cervical spondylosis.  CT neck (09/19/22) revealed right internal jugular vein thrombosis, numerous enlarged mediastinal lymph nodes some demonstrating central necrosis, and a spiculated 2.1 x 1.6 cm mass within the right upper lobe concerning for pulmonary neoplasm.  CT C/A/P (09/20/22) revealed: 2.8 cm right upper lobe spiculated lesion extending to pleural surface high suspicious for bronchogenic neoplasm. Few scattered small nodules related to 3 mm too small to characterize. Extensive mediastinal necrotic lymphadenopathy. Mild right hilar lymphadenopathy. Small right pleural effusion. Subcentimeter right hepatic lobe hypodensity too small to characterize. No abdominal or pelvic lymphadenopathy. No bowel obstruction or ascites.  CT Guided RUL nodule biopsy (09/21/22)  ~ approximately 1.6x 1.9 cm spiculated lesion in the posterior right upper lobe.  Pathology: NSCLC, TTF-1 and PD-L1 (50-60%) Positive. P40, Synaptophysin, Chromogranin - negative.  Right internal jugular vein DVT on admission, she was started on Eliquis and is now on long-term Lovenox.  11/21/22 - CT A/P: left-sided colitis/proctitis. Possible hepatic flexure neoplasm. Small splenic infarct, moderate pleural effusion.   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   She is s/p Alimta carboplatin, now on Alimta maintenance, managed by Dr. [facility].  01/04/23 - Right VATS, drainage of effusion, TALC pleurodesis and intercostal nerve block with Dr. Sales. Pathology: histopathologic section showed cluster reactive mesothelial cells, histiocytes, and lymphocytes. Mesothelial cells positive for Calretinin and WT-1. Epithelial martkers TTF-1, JOSE EP4, and MOC-31 negative.  03/22/23 - Flex sigmoidoscopy. Colon biopsy (induced colitis) showed active colitis with cytitis. 06/10/23 - Patient had rectal bleeding was referred to GI (Dr. Kilgore) - still with active colitis, negative for dysplasia and granuloma.  05/22/23 - PET Scan ([facility]) revealed a 2.3 spiculated RUL nodule with increased size and activity from prior PET/CT. Hypermetabolic right hilar LN, slightly increased in activity. New multifocal hypermetabolic right pleural activity, consistent with pleural metastatic disease. Resolution of multifocal hypermetabolic cervical LN and extensive hypermetabolic mediastinal LN from prior PET CT. Resolution of multifocal hypermetabolic soft tissue nodule left deltoid. New small nonspecific focal mild activity right posterior larynx without obvious CT Correlate.  09/28/23 CT C/A/P at [facility] demonstrated a decrease in the RUL lesion. Right chest pleural thickening most prominent posterior medial mildly decreased. Subcentimeter right hilar LN decreased. No evidence of abdominal or pelvis metastatic disease.  01/25/24 CT C/A/P at [facility] revealed: 1. no interval change in the spiculated mass in the RUL or scattered pleural nodularity and right basilar airspace disease. No suspicious new nodules. 2. Interval increase in the right hilar adenopathy, slightly necrotic in appearance. 2. No intra-abdominal or pelvic lymphadenopathy or metastatic disease.  05/02/24 Last follow-up with Dr. Goldberg wherein she c/o dyspnea related to emphysema without pneumonitis.  05/13/24 CT C/A/P at [facility] revealed: 1. interval worsening of disease with large heterogeneous mass at the right hilum/medial aspect right lung significantly increased in size and a new 5mm nodular component at the inferior aspect of the elongated spiculated mass in the posterior RUL. 2. Hypodense focus in the right pulmonary vein to its junction with the right atrium concerning for tumor thrombus.  05/22/24 Seen by Rad Onc to evaluate for localized hilar RT  She reports that she is now off Alimta and will be switching to Taxotere.  She feels POLO and continues to have a chronic cough.    Pmhx: breast cancer, SCC, Bilateral jugular DVT, nephrolithiasis, Lyme Disease, Vit D Deficiency, depression.  Family history of lung cancer with both her father and mother.  [de-identified] : Pulmonary doctor next on the 16th, recommendation not to have chemo until current episode is resolved Pulmonologist felt pneumonia was radiation induced. Patient has not found a primary doctor as yet Frequent tickling type cough feels he has allergies

## 2024-09-11 NOTE — REVIEW OF SYSTEMS
[Fatigue] : fatigue [Nosebleeds] : nosebleeds [Shortness Of Breath] : shortness of breath [Wheezing] : wheezing [Cough] : cough [Diarrhea: Grade 0] : Diarrhea: Grade 0 [Incontinence] : incontinence [Easy Bleeding] : a tendency for easy bleeding [Negative] : Eyes [Fever] : no fever [Chills] : no chills [Night Sweats] : no night sweats [Recent Change In Weight] : ~T no recent weight change [Dysphagia] : no dysphagia [Loss of Hearing] : no loss of hearing [Hoarseness] : no hoarseness [Odynophagia] : no odynophagia [Mucosal Pain] : no mucosal pain [Abdominal Pain] : no abdominal pain [Vomiting] : no vomiting [Constipation] : no constipation [Dizziness] : no dizziness [Suicidal] : not suicidal [Insomnia] : no insomnia [Proptosis] : no proptosis [Hot Flashes] : no hot flashes [Muscle Weakness] : no muscle weakness [Deepening Of The Voice] : no deepening of the voice [FreeTextEntry2] : No weight changes  [FreeTextEntry4] : last nosebleed was 6 months ago,managed at home [FreeTextEntry5] : dizzyness on standing drom sitting gardening [FreeTextEntry6] : Pulse oximeter 94 to 96 without oxygen,has oxygen at home [FreeTextEntry8] : at night stress with coughing [de-identified] : some brusing from lovenox [de-identified] : lovenox

## 2024-09-11 NOTE — HISTORY OF PRESENT ILLNESS
[de-identified] : Ms. Han is a 73-year-old female with metastatic adenocarcinoma of the lung, diagnosed in 9/2022. Here for second opinion.  Initially presented in 9/11/22 for c/o right sided neck pain that started after lifting a box and was admitted at [facility].  CT Cervical Spine (09/19/22) revealed: No acute cervical fracture or traumatic malalignment. Multilevel cervical spondylosis.  CT neck (09/19/22) revealed right internal jugular vein thrombosis, numerous enlarged mediastinal lymph nodes some demonstrating central necrosis, and a spiculated 2.1 x 1.6 cm mass within the right upper lobe concerning for pulmonary neoplasm.  CT C/A/P (09/20/22) revealed: 2.8 cm right upper lobe spiculated lesion extending to pleural surface high suspicious for bronchogenic neoplasm. Few scattered small nodules related to 3 mm too small to characterize. Extensive mediastinal necrotic lymphadenopathy. Mild right hilar lymphadenopathy. Small right pleural effusion. Subcentimeter right hepatic lobe hypodensity too small to characterize. No abdominal or pelvic lymphadenopathy. No bowel obstruction or ascites.  CT Guided RUL nodule biopsy (09/21/22)  ~ approximately 1.6x 1.9 cm spiculated lesion in the posterior right upper lobe.  Pathology: NSCLC, TTF-1 and PD-L1 (50-60%) Positive. P40, Synaptophysin, Chromogranin - negative.  Right internal jugular vein DVT on admission, she was started on Eliquis and is now on long-term Lovenox.  11/21/22 - CT A/P: left-sided colitis/proctitis. Possible hepatic flexure neoplasm. Small splenic infarct, moderate pleural effusion.   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   She is s/p Alimta carboplatin, now on Alimta maintenance, managed by Dr. Goldberg[facility].  01/04/23 - Right VATS, drainage of effusion, TALC pleurodesis and intercostal nerve block with Dr. Sales. Pathology: histopathologic section showed cluster reactive mesothelial cells, histiocytes, and lymphocytes. Mesothelial cells positive for Calretinin and WT-1. Epithelial martkers TTF-1, JOSE EP4, and MOC-31 negative.  03/22/23 - Flex sigmoidoscopy. Colon biopsy (induced colitis) showed active colitis with cytitis. 06/10/23 - Patient had rectal bleeding was referred to GI (Dr. Kilgore) - still with active colitis, negative for dysplasia and granuloma.  05/22/23 - PET Scan ([facility]) revealed a 2.3 spiculated RUL nodule with increased size and activity from prior PET/CT. Hypermetabolic right hilar LN, slightly increased in activity. New multifocal hypermetabolic right pleural activity, consistent with pleural metastatic disease. Resolution of multifocal hypermetabolic cervical LN and extensive hypermetabolic mediastinal LN from prior PET CT. Resolution of multifocal hypermetabolic soft tissue nodule left deltoid. New small nonspecific focal mild activity right posterior larynx without obvious CT Correlate.  09/28/23 CT C/A/P at [facility] demonstrated a decrease in the RUL lesion. Right chest pleural thickening most prominent posterior medial mildly decreased. Subcentimeter right hilar LN decreased. No evidence of abdominal or pelvis metastatic disease.  01/25/24 CT C/A/P at [facility] revealed: 1. no interval change in the spiculated mass in the RUL or scattered pleural nodularity and right basilar airspace disease. No suspicious new nodules. 2. Interval increase in the right hilar adenopathy, slightly necrotic in appearance. 2. No intra-abdominal or pelvic lymphadenopathy or metastatic disease.  05/02/24 Last follow-up with Dr. Goldberg wherein she c/o dyspnea related to emphysema without pneumonitis.  05/13/24 CT C/A/P at [facility] revealed: 1. interval worsening of disease with large heterogeneous mass at the right hilum/medial aspect right lung significantly increased in size and a new 5mm nodular component at the inferior aspect of the elongated spiculated mass in the posterior RUL. 2. Hypodense focus in the right pulmonary vein to its junction with the right atrium concerning for tumor thrombus.  05/22/24 Seen by Rad Onc to evaluate for localized hilar RT  She reports that she is now off Alimta and will be switching to Taxotere.  She feels POLO and continues to have a chronic cough.    Pmhx: breast cancer, SCC, Bilateral jugular DVT, nephrolithiasis, Lyme Disease, Vit D Deficiency, depression.  Family history of lung cancer with both her father and mother.  [de-identified] : Pulmonary doctor next on the 16th, recommendation not to have chemo until current episode is resolved Pulmonologist felt pneumonia was radiation induced. Patient has not found a primary doctor as yet Frequent tickling type cough feels he has allergies

## 2024-09-11 NOTE — ASSESSMENT
[FreeTextEntry1] : Reviewed diagnosis imaging and prior treatment plan with the patient and her  PDL1>50% started on Keytruda in 11/2022 however unfortunately complicated by steroid refractory colitis. Currently on Entyvio Patient most recently on carbo/Alimta with Alimta maintenance now with POD primarily in right hilar region. 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   7/16/24: C1 Taxotere  CT C/A/P 8/2/24 *  No pulmonary embolism. *  0.8 cm filling defect in the posterior of the right superior pulmonary vein (2-144), previously 1.7 cm. This likely represents tumor thrombus showing interval response to therapy. *  Stable 1.5 cm spiculated opacity in the right lung apex with pleural tethering.  *  Persistent patchy consolidation in the right lower lobe, which has become slightly more confluent. Persistent patchy opacities in the right middle and right upper lobe. New focal consolidation in the posterior right upper lobe. Correlate for pneumonia versus radiation changes. *  No acute pathology or metastatic disease in the abdomen.  Patient discharged 8/5/2024 from [facility] for PNA and neutropenia.  Consideration for radiation pneumonitis however improved with abx. Now completed abx.   BAL 8/29/24: reactive benign cells  RLL transbronchial bx 8/29/24:  A. RIGHT LOWER LOBE LUNG, BIOPSY - Organizing pneumonia - Atypical pneumoyctes, with prominent nucleoli and mild nuclear hyperchromasia, highlighted by TTF1 and cytokeratin 7 stains, favored to be reactive - A stain for p40 supports this diagnosis - Deeper levels were examined  Admitted to [facility] for bronchoscopy BAL negative Evidence of organizing PNA  Plan: >CBC, CMP at every visit.  CEA added monthly >will hold Cycle 2 until cleared by Pulmonary. Currently on steroid taper >follow up with pulmonary  >follow up with Rad Onc for hilar RT >continue Lovenox at 80 mg daily >holding Entyvio after discussion with Dr. Dasilva and patient's preference. Will continue to monitor >Dexa scan for osteoporosis hx   RTC in 2 weeks

## 2024-09-11 NOTE — ASSESSMENT
[FreeTextEntry1] : Reviewed diagnosis imaging and prior treatment plan with the patient and her  PDL1>50% started on Keytruda in 11/2022 however unfortunately complicated by steroid refractory colitis. Currently on Entyvio Patient most recently on carbo/Alimta with Alimta maintenance now with POD primarily in right hilar region. 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   7/16/24: C1 Taxotere  CT C/A/P 8/2/24 *  No pulmonary embolism. *  0.8 cm filling defect in the posterior of the right superior pulmonary vein (2-144), previously 1.7 cm. This likely represents tumor thrombus showing interval response to therapy. *  Stable 1.5 cm spiculated opacity in the right lung apex with pleural tethering.  *  Persistent patchy consolidation in the right lower lobe, which has become slightly more confluent. Persistent patchy opacities in the right middle and right upper lobe. New focal consolidation in the posterior right upper lobe. Correlate for pneumonia versus radiation changes. *  No acute pathology or metastatic disease in the abdomen.  Patient discharged 8/5/2024 from [facility] for PNA and neutropenia.  Consideration for radiation pneumonitis however improved with abx. Now completed abx.   BAL 8/29/24: reactive benign cells  RLL transbronchial bx 8/29/24:  A. RIGHT LOWER LOBE LUNG, BIOPSY - Organizing pneumonia - Atypical pneumoyctes, with prominent nucleoli and mild nuclear hyperchromasia, highlighted by TTF1 and cytokeratin 7 stains, favored to be reactive - A stain for p40 supports this diagnosis - Deeper levels were examined  Admitted to [facility] for bronchoscopy BAL negative Evidence of organizing PNA  Plan: >CBC, CMP at every visit.  CEA added monthly >will hold Cycle 2 until cleared by Pulmonary. Currently on steroid taper >follow up with pulmonary  >follow up with Rad Onc for hilar RT >continue Lovenox at 80 mg daily >holding Entyvio after discussion with Dr. Dasilva and patient's preference. Will continue to monitor >Dexa scan for osteoporosis hx   RTC in 2 weeks

## 2024-09-13 ENCOUNTER — OFFICE (OUTPATIENT)
Dept: URBAN - METROPOLITAN AREA CLINIC 122 | Facility: CLINIC | Age: 74
Setting detail: OPHTHALMOLOGY
End: 2024-09-13
Payer: MEDICARE

## 2024-09-13 DIAGNOSIS — H16.223: ICD-10-CM

## 2024-09-13 DIAGNOSIS — H25.13: ICD-10-CM

## 2024-09-13 DIAGNOSIS — H52.4: ICD-10-CM

## 2024-09-13 PROCEDURE — 92015 DETERMINE REFRACTIVE STATE: CPT | Performed by: OPHTHALMOLOGY

## 2024-09-13 PROCEDURE — 92014 COMPRE OPH EXAM EST PT 1/>: CPT | Performed by: OPHTHALMOLOGY

## 2024-09-13 ASSESSMENT — CONFRONTATIONAL VISUAL FIELD TEST (CVF)
OS_FINDINGS: FULL
OD_FINDINGS: FULL

## 2024-09-30 ENCOUNTER — LABORATORY RESULT (OUTPATIENT)
Age: 74
End: 2024-09-30

## 2024-09-30 ENCOUNTER — RESULT REVIEW (OUTPATIENT)
Age: 74
End: 2024-09-30

## 2024-09-30 ENCOUNTER — APPOINTMENT (OUTPATIENT)
Dept: HEMATOLOGY ONCOLOGY | Facility: CLINIC | Age: 74
End: 2024-09-30
Payer: MEDICARE

## 2024-09-30 VITALS
HEIGHT: 65 IN | BODY MASS INDEX: 20.33 KG/M2 | SYSTOLIC BLOOD PRESSURE: 113 MMHG | TEMPERATURE: 99.8 F | HEART RATE: 120 BPM | WEIGHT: 122 LBS | RESPIRATION RATE: 16 BRPM | DIASTOLIC BLOOD PRESSURE: 72 MMHG | OXYGEN SATURATION: 92 %

## 2024-09-30 DIAGNOSIS — R05.9 COUGH, UNSPECIFIED: ICD-10-CM

## 2024-09-30 DIAGNOSIS — C34.90 MALIGNANT NEOPLASM OF UNSPECIFIED PART OF UNSPECIFIED BRONCHUS OR LUNG: ICD-10-CM

## 2024-09-30 PROCEDURE — 99215 OFFICE O/P EST HI 40 MIN: CPT

## 2024-09-30 PROCEDURE — G2211 COMPLEX E/M VISIT ADD ON: CPT

## 2024-09-30 RX ORDER — ALBUTEROL SULFATE 2.5 MG/3ML
(2.5 MG/3ML) SOLUTION RESPIRATORY (INHALATION)
Qty: 360 | Refills: 0 | Status: ACTIVE | COMMUNITY
Start: 2024-07-26

## 2024-09-30 RX ORDER — PREDNISONE 20 MG/1
20 TABLET ORAL
Qty: 60 | Refills: 0 | Status: ACTIVE | COMMUNITY
Start: 2024-08-31

## 2024-09-30 RX ORDER — CEFUROXIME AXETIL 250 MG/1
250 TABLET ORAL
Qty: 4 | Refills: 0 | Status: ACTIVE | COMMUNITY
Start: 2024-08-31

## 2024-09-30 RX ORDER — ALBUTEROL SULFATE 90 UG/1
108 (90 BASE) INHALANT RESPIRATORY (INHALATION)
Qty: 8 | Refills: 0 | Status: ACTIVE | COMMUNITY
Start: 2024-08-05

## 2024-09-30 RX ORDER — AZELASTINE HYDROCHLORIDE 137 UG/1
137 SPRAY, METERED NASAL
Qty: 30 | Refills: 0 | Status: ACTIVE | COMMUNITY
Start: 2024-07-26

## 2024-10-01 NOTE — ASSESSMENT
[FreeTextEntry1] : Reviewed diagnosis imaging and prior treatment plan with the patient and her  PDL1>50% started on Keytruda in 11/2022 however unfortunately complicated by steroid refractory colitis. Currently on Entyvio Patient most recently on carbo/Alimta with Alimta maintenance now with POD primarily in right hilar region. 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   7/16/24: C1 Taxotere  CT C/A/P 8/2/24 *  No pulmonary embolism. *  0.8 cm filling defect in the posterior of the right superior pulmonary vein (2-144), previously 1.7 cm. This likely represents tumor thrombus showing interval response to therapy. *  Stable 1.5 cm spiculated opacity in the right lung apex with pleural tethering.  *  Persistent patchy consolidation in the right lower lobe, which has become slightly more confluent. Persistent patchy opacities in the right middle and right upper lobe. New focal consolidation in the posterior right upper lobe. Correlate for pneumonia versus radiation changes. *  No acute pathology or metastatic disease in the abdomen.  Patient discharged 8/5/2024 from [facility] for PNA and neutropenia.  Consideration for radiation pneumonitis however improved with abx. Now completed abx.   BAL 8/29/24: reactive benign cells  RLL transbronchial bx 8/29/24:  A. RIGHT LOWER LOBE LUNG, BIOPSY - Organizing pneumonia - Atypical pneumoyctes, with prominent nucleoli and mild nuclear hyperchromasia, highlighted by TTF1 and cytokeratin 7 stains, favored to be reactive - A stain for p40 supports this diagnosis - Deeper levels were examined  Admitted to [facility] for bronchoscopy BAL negative Evidence of organizing PNA  Plan: >CBC, CMP at every visit.  CEA added monthly >will hold Cycle 2 until cleared by Pulmonary. Currently on steroid taper >follow up with pulmonary  >follow up with Rad Onc for hilar RT >continue Lovenox at 80 mg daily >holding Entyvio after discussion with Dr. Dasilva and patient's preference. Will continue to monitor >Dexa scan for osteoporosis hx   RTC in 2 weeks

## 2024-10-01 NOTE — ASSESSMENT
[FreeTextEntry1] : Reviewed diagnosis imaging and prior treatment plan with the patient and her  PDL1>50% started on Keytruda in 11/2022 however unfortunately complicated by steroid refractory colitis. Currently on Entyvio Patient most recently on carbo/Alimta with Alimta maintenance now with POD primarily in right hilar region. 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   7/16/24: C1 Taxotere  CT C/A/P 8/2/24 *  No pulmonary embolism. *  0.8 cm filling defect in the posterior of the right superior pulmonary vein (2-144), previously 1.7 cm. This likely represents tumor thrombus showing interval response to therapy. *  Stable 1.5 cm spiculated opacity in the right lung apex with pleural tethering.  *  Persistent patchy consolidation in the right lower lobe, which has become slightly more confluent. Persistent patchy opacities in the right middle and right upper lobe. New focal consolidation in the posterior right upper lobe. Correlate for pneumonia versus radiation changes. *  No acute pathology or metastatic disease in the abdomen.  Patient discharged 8/5/2024 from [facility] for PNA and neutropenia.  Consideration for radiation pneumonitis however improved with abx. Now completed abx.   BAL 8/29/24: reactive benign cells  RLL transbronchial bx 8/29/24:  A. RIGHT LOWER LOBE LUNG, BIOPSY - Organizing pneumonia - Atypical pneumoyctes, with prominent nucleoli and mild nuclear hyperchromasia, highlighted by TTF1 and cytokeratin 7 stains, favored to be reactive - A stain for p40 supports this diagnosis - Deeper levels were examined  Admitted to [facility] for bronchoscopy BAL negative Evidence of organizing PNA  Plan: >CBC, CMP at every visit.  CEA added monthly >will hold Cycle 2 until cleared by Pulmonary. Currently on steroid taper >follow up with pulmonary  >follow up with Rad Onc for hilar RT >continue Lovenox at 80 mg daily >holding Entyvio after discussion with Dr. Dasilva and patient's preference. Will continue to monitor >Dexa scan for osteoporosis hx   RTC in 2 weeks

## 2024-10-01 NOTE — HISTORY OF PRESENT ILLNESS
[de-identified] : Ms. Han is a 73-year-old female with metastatic adenocarcinoma of the lung, diagnosed in 9/2022. Here for second opinion.  Initially presented in 9/11/22 for c/o right sided neck pain that started after lifting a box and was admitted at [facility].  CT Cervical Spine (09/19/22) revealed: No acute cervical fracture or traumatic malalignment. Multilevel cervical spondylosis.  CT neck (09/19/22) revealed right internal jugular vein thrombosis, numerous enlarged mediastinal lymph nodes some demonstrating central necrosis, and a spiculated 2.1 x 1.6 cm mass within the right upper lobe concerning for pulmonary neoplasm.  CT C/A/P (09/20/22) revealed: 2.8 cm right upper lobe spiculated lesion extending to pleural surface high suspicious for bronchogenic neoplasm. Few scattered small nodules related to 3 mm too small to characterize. Extensive mediastinal necrotic lymphadenopathy. Mild right hilar lymphadenopathy. Small right pleural effusion. Subcentimeter right hepatic lobe hypodensity too small to characterize. No abdominal or pelvic lymphadenopathy. No bowel obstruction or ascites.  CT Guided RUL nodule biopsy (09/21/22)  ~ approximately 1.6x 1.9 cm spiculated lesion in the posterior right upper lobe.  Pathology: NSCLC, TTF-1 and PD-L1 (50-60%) Positive. P40, Synaptophysin, Chromogranin - negative.  Right internal jugular vein DVT on admission, she was started on Eliquis and is now on long-term Lovenox.  11/21/22 - CT A/P: left-sided colitis/proctitis. Possible hepatic flexure neoplasm. Small splenic infarct, moderate pleural effusion.   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   She is s/p Alimta carboplatin, now on Alimta maintenance, managed by Dr. [facility].  01/04/23 - Right VATS, drainage of effusion, TALC pleurodesis and intercostal nerve block with Dr. Sales. Pathology: histopathologic section showed cluster reactive mesothelial cells, histiocytes, and lymphocytes. Mesothelial cells positive for Calretinin and WT-1. Epithelial martkers TTF-1, JOSE EP4, and MOC-31 negative.  03/22/23 - Flex sigmoidoscopy. Colon biopsy (induced colitis) showed active colitis with cytitis. 06/10/23 - Patient had rectal bleeding was referred to GI (Dr. Kilgore) - still with active colitis, negative for dysplasia and granuloma.  05/22/23 - PET Scan ([facility]) revealed a 2.3 spiculated RUL nodule with increased size and activity from prior PET/CT. Hypermetabolic right hilar LN, slightly increased in activity. New multifocal hypermetabolic right pleural activity, consistent with pleural metastatic disease. Resolution of multifocal hypermetabolic cervical LN and extensive hypermetabolic mediastinal LN from prior PET CT. Resolution of multifocal hypermetabolic soft tissue nodule left deltoid. New small nonspecific focal mild activity right posterior larynx without obvious CT Correlate.  09/28/23 CT C/A/P at [facility] demonstrated a decrease in the RUL lesion. Right chest pleural thickening most prominent posterior medial mildly decreased. Subcentimeter right hilar LN decreased. No evidence of abdominal or pelvis metastatic disease.  01/25/24 CT C/A/P at [facility] revealed: 1. no interval change in the spiculated mass in the RUL or scattered pleural nodularity and right basilar airspace disease. No suspicious new nodules. 2. Interval increase in the right hilar adenopathy, slightly necrotic in appearance. 2. No intra-abdominal or pelvic lymphadenopathy or metastatic disease.  05/02/24 Last follow-up with Dr. Goldberg wherein she c/o dyspnea related to emphysema without pneumonitis.  05/13/24 CT C/A/P at [facility] revealed: 1. interval worsening of disease with large heterogeneous mass at the right hilum/medial aspect right lung significantly increased in size and a new 5mm nodular component at the inferior aspect of the elongated spiculated mass in the posterior RUL. 2. Hypodense focus in the right pulmonary vein to its junction with the right atrium concerning for tumor thrombus.  05/22/24 Seen by Rad Onc to evaluate for localized hilar RT  She reports that she is now off Alimta and will be switching to Taxotere.  She feels POLO and continues to have a chronic cough.    Pmhx: breast cancer, SCC, Bilateral jugular DVT, nephrolithiasis, Lyme Disease, Vit D Deficiency, depression.  Family history of lung cancer with both her father and mother.  [de-identified] : Currently being treated for pneumonia ,on an antibiotic and tapering prednisone Patient is short of breath on minimal  exertion has not used oxygen in 3 weeks  O2 sat is 96 to 98% on room air at home Next  appointment with Pulmonologist is Friday Has persistent cough, productive yellowish in color also has  some pleuritic type pain in left upper back she attributes to lifting up her grandson Patient feels cough is due to seasonal allergies

## 2024-10-01 NOTE — HISTORY OF PRESENT ILLNESS
[de-identified] : Ms. Han is a 73-year-old female with metastatic adenocarcinoma of the lung, diagnosed in 9/2022. Here for second opinion.  Initially presented in 9/11/22 for c/o right sided neck pain that started after lifting a box and was admitted at [facility].  CT Cervical Spine (09/19/22) revealed: No acute cervical fracture or traumatic malalignment. Multilevel cervical spondylosis.  CT neck (09/19/22) revealed right internal jugular vein thrombosis, numerous enlarged mediastinal lymph nodes some demonstrating central necrosis, and a spiculated 2.1 x 1.6 cm mass within the right upper lobe concerning for pulmonary neoplasm.  CT C/A/P (09/20/22) revealed: 2.8 cm right upper lobe spiculated lesion extending to pleural surface high suspicious for bronchogenic neoplasm. Few scattered small nodules related to 3 mm too small to characterize. Extensive mediastinal necrotic lymphadenopathy. Mild right hilar lymphadenopathy. Small right pleural effusion. Subcentimeter right hepatic lobe hypodensity too small to characterize. No abdominal or pelvic lymphadenopathy. No bowel obstruction or ascites.  CT Guided RUL nodule biopsy (09/21/22)  ~ approximately 1.6x 1.9 cm spiculated lesion in the posterior right upper lobe.  Pathology: NSCLC, TTF-1 and PD-L1 (50-60%) Positive. P40, Synaptophysin, Chromogranin - negative.  Right internal jugular vein DVT on admission, she was started on Eliquis and is now on long-term Lovenox.  11/21/22 - CT A/P: left-sided colitis/proctitis. Possible hepatic flexure neoplasm. Small splenic infarct, moderate pleural effusion.   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   She is s/p Alimta carboplatin, now on Alimta maintenance, managed by Dr. Goldberg[facility].  01/04/23 - Right VATS, drainage of effusion, TALC pleurodesis and intercostal nerve block with Dr. Sales. Pathology: histopathologic section showed cluster reactive mesothelial cells, histiocytes, and lymphocytes. Mesothelial cells positive for Calretinin and WT-1. Epithelial martkers TTF-1, JOSE EP4, and MOC-31 negative.  03/22/23 - Flex sigmoidoscopy. Colon biopsy (induced colitis) showed active colitis with cytitis. 06/10/23 - Patient had rectal bleeding was referred to GI (Dr. Kilgore) - still with active colitis, negative for dysplasia and granuloma.  05/22/23 - PET Scan ([facility]) revealed a 2.3 spiculated RUL nodule with increased size and activity from prior PET/CT. Hypermetabolic right hilar LN, slightly increased in activity. New multifocal hypermetabolic right pleural activity, consistent with pleural metastatic disease. Resolution of multifocal hypermetabolic cervical LN and extensive hypermetabolic mediastinal LN from prior PET CT. Resolution of multifocal hypermetabolic soft tissue nodule left deltoid. New small nonspecific focal mild activity right posterior larynx without obvious CT Correlate.  09/28/23 CT C/A/P at [facility] demonstrated a decrease in the RUL lesion. Right chest pleural thickening most prominent posterior medial mildly decreased. Subcentimeter right hilar LN decreased. No evidence of abdominal or pelvis metastatic disease.  01/25/24 CT C/A/P at [facility] revealed: 1. no interval change in the spiculated mass in the RUL or scattered pleural nodularity and right basilar airspace disease. No suspicious new nodules. 2. Interval increase in the right hilar adenopathy, slightly necrotic in appearance. 2. No intra-abdominal or pelvic lymphadenopathy or metastatic disease.  05/02/24 Last follow-up with Dr. Goldberg wherein she c/o dyspnea related to emphysema without pneumonitis.  05/13/24 CT C/A/P at [facility] revealed: 1. interval worsening of disease with large heterogeneous mass at the right hilum/medial aspect right lung significantly increased in size and a new 5mm nodular component at the inferior aspect of the elongated spiculated mass in the posterior RUL. 2. Hypodense focus in the right pulmonary vein to its junction with the right atrium concerning for tumor thrombus.  05/22/24 Seen by Rad Onc to evaluate for localized hilar RT  She reports that she is now off Alimta and will be switching to Taxotere.  She feels POLO and continues to have a chronic cough.    Pmhx: breast cancer, SCC, Bilateral jugular DVT, nephrolithiasis, Lyme Disease, Vit D Deficiency, depression.  Family history of lung cancer with both her father and mother.  [de-identified] : Currently being treated for pneumonia ,on an antibiotic and tapering prednisone Patient is short of breath on minimal  exertion has not used oxygen in 3 weeks  O2 sat is 96 to 98% on room air at home Next  appointment with Pulmonologist is Friday Has persistent cough, productive yellowish in color also has  some pleuritic type pain in left upper back she attributes to lifting up her grandson Patient feels cough is due to seasonal allergies

## 2024-10-01 NOTE — HISTORY OF PRESENT ILLNESS
[de-identified] : Ms. Han is a 73-year-old female with metastatic adenocarcinoma of the lung, diagnosed in 9/2022. Here for second opinion.  Initially presented in 9/11/22 for c/o right sided neck pain that started after lifting a box and was admitted at [facility].  CT Cervical Spine (09/19/22) revealed: No acute cervical fracture or traumatic malalignment. Multilevel cervical spondylosis.  CT neck (09/19/22) revealed right internal jugular vein thrombosis, numerous enlarged mediastinal lymph nodes some demonstrating central necrosis, and a spiculated 2.1 x 1.6 cm mass within the right upper lobe concerning for pulmonary neoplasm.  CT C/A/P (09/20/22) revealed: 2.8 cm right upper lobe spiculated lesion extending to pleural surface high suspicious for bronchogenic neoplasm. Few scattered small nodules related to 3 mm too small to characterize. Extensive mediastinal necrotic lymphadenopathy. Mild right hilar lymphadenopathy. Small right pleural effusion. Subcentimeter right hepatic lobe hypodensity too small to characterize. No abdominal or pelvic lymphadenopathy. No bowel obstruction or ascites.  CT Guided RUL nodule biopsy (09/21/22)  ~ approximately 1.6x 1.9 cm spiculated lesion in the posterior right upper lobe.  Pathology: NSCLC, TTF-1 and PD-L1 (50-60%) Positive. P40, Synaptophysin, Chromogranin - negative.  Right internal jugular vein DVT on admission, she was started on Eliquis and is now on long-term Lovenox.  11/21/22 - CT A/P: left-sided colitis/proctitis. Possible hepatic flexure neoplasm. Small splenic infarct, moderate pleural effusion.   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   She is s/p Alimta carboplatin, now on Alimta maintenance, managed by Dr. Goldberg[facility].  01/04/23 - Right VATS, drainage of effusion, TALC pleurodesis and intercostal nerve block with Dr. Sales. Pathology: histopathologic section showed cluster reactive mesothelial cells, histiocytes, and lymphocytes. Mesothelial cells positive for Calretinin and WT-1. Epithelial martkers TTF-1, JOSE EP4, and MOC-31 negative.  03/22/23 - Flex sigmoidoscopy. Colon biopsy (induced colitis) showed active colitis with cytitis. 06/10/23 - Patient had rectal bleeding was referred to GI (Dr. Kilgore) - still with active colitis, negative for dysplasia and granuloma.  05/22/23 - PET Scan ([facility]) revealed a 2.3 spiculated RUL nodule with increased size and activity from prior PET/CT. Hypermetabolic right hilar LN, slightly increased in activity. New multifocal hypermetabolic right pleural activity, consistent with pleural metastatic disease. Resolution of multifocal hypermetabolic cervical LN and extensive hypermetabolic mediastinal LN from prior PET CT. Resolution of multifocal hypermetabolic soft tissue nodule left deltoid. New small nonspecific focal mild activity right posterior larynx without obvious CT Correlate.  09/28/23 CT C/A/P at [facility] demonstrated a decrease in the RUL lesion. Right chest pleural thickening most prominent posterior medial mildly decreased. Subcentimeter right hilar LN decreased. No evidence of abdominal or pelvis metastatic disease.  01/25/24 CT C/A/P at [facility] revealed: 1. no interval change in the spiculated mass in the RUL or scattered pleural nodularity and right basilar airspace disease. No suspicious new nodules. 2. Interval increase in the right hilar adenopathy, slightly necrotic in appearance. 2. No intra-abdominal or pelvic lymphadenopathy or metastatic disease.  05/02/24 Last follow-up with Dr. Goldberg wherein she c/o dyspnea related to emphysema without pneumonitis.  05/13/24 CT C/A/P at [facility] revealed: 1. interval worsening of disease with large heterogeneous mass at the right hilum/medial aspect right lung significantly increased in size and a new 5mm nodular component at the inferior aspect of the elongated spiculated mass in the posterior RUL. 2. Hypodense focus in the right pulmonary vein to its junction with the right atrium concerning for tumor thrombus.  05/22/24 Seen by Rad Onc to evaluate for localized hilar RT  She reports that she is now off Alimta and will be switching to Taxotere.  She feels POLO and continues to have a chronic cough.    Pmhx: breast cancer, SCC, Bilateral jugular DVT, nephrolithiasis, Lyme Disease, Vit D Deficiency, depression.  Family history of lung cancer with both her father and mother.  [de-identified] : Currently being treated for pneumonia ,on an antibiotic and tapering prednisone Patient is short of breath on minimal  exertion has not used oxygen in 3 weeks  O2 sat is 96 to 98% on room air at home Next  appointment with Pulmonologist is Friday Has persistent cough, productive yellowish in color also has  some pleuritic type pain in left upper back she attributes to lifting up her grandson Patient feels cough is due to seasonal allergies

## 2024-10-01 NOTE — REVIEW OF SYSTEMS
[Negative] : Constitutional [Fever] : no fever [Chills] : no chills [Night Sweats] : no night sweats [Recent Change In Weight] : ~T no recent weight change [Dysphagia] : no dysphagia [Loss of Hearing] : no loss of hearing [Hoarseness] : no hoarseness [Odynophagia] : no odynophagia [Mucosal Pain] : no mucosal pain [Abdominal Pain] : no abdominal pain [Vomiting] : no vomiting [Constipation] : no constipation [Dizziness] : no dizziness [Suicidal] : not suicidal [Insomnia] : no insomnia [Proptosis] : no proptosis [Hot Flashes] : no hot flashes [Muscle Weakness] : no muscle weakness [Deepening Of The Voice] : no deepening of the voice [FreeTextEntry2] : No weight changes  [FreeTextEntry4] : feels she has post nasal drip,food has no sence of tase intermittently [FreeTextEntry5] : dizzyness improved,feels her heart goes fast from time to time [FreeTextEntry6] : Pulse oximeter 94 to 96 without oxygen,has oxygen at home [FreeTextEntry8] : at night stress with coughing [de-identified] : some brusing from lovenox [de-identified] : lovenox

## 2024-10-01 NOTE — REVIEW OF SYSTEMS
[Negative] : Constitutional [Fever] : no fever [Chills] : no chills [Night Sweats] : no night sweats [Recent Change In Weight] : ~T no recent weight change [Dysphagia] : no dysphagia [Loss of Hearing] : no loss of hearing [Hoarseness] : no hoarseness [Odynophagia] : no odynophagia [Mucosal Pain] : no mucosal pain [Abdominal Pain] : no abdominal pain [Vomiting] : no vomiting [Constipation] : no constipation [Dizziness] : no dizziness [Suicidal] : not suicidal [Insomnia] : no insomnia [Proptosis] : no proptosis [Hot Flashes] : no hot flashes [Muscle Weakness] : no muscle weakness [Deepening Of The Voice] : no deepening of the voice [FreeTextEntry2] : No weight changes  [FreeTextEntry4] : feels she has post nasal drip,food has no sence of tase intermittently [FreeTextEntry5] : dizzyness improved,feels her heart goes fast from time to time [FreeTextEntry6] : Pulse oximeter 94 to 96 without oxygen,has oxygen at home [FreeTextEntry8] : at night stress with coughing [de-identified] : some brusing from lovenox [de-identified] : lovenox

## 2024-10-01 NOTE — ASSESSMENT
[FreeTextEntry1] : Reviewed diagnosis imaging and prior treatment plan with the patient and her  PDL1>50% started on Keytruda in 11/2022 however unfortunately complicated by steroid refractory colitis. Currently on Entyvio Patient most recently on carbo/Alimta with Alimta maintenance now with POD primarily in right hilar region. 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   7/16/24: C1 Taxotere  CT C/A/P 8/2/24 *  No pulmonary embolism. *  0.8 cm filling defect in the posterior of the right superior pulmonary vein (2-144), previously 1.7 cm. This likely represents tumor thrombus showing interval response to therapy. *  Stable 1.5 cm spiculated opacity in the right lung apex with pleural tethering.  *  Persistent patchy consolidation in the right lower lobe, which has become slightly more confluent. Persistent patchy opacities in the right middle and right upper lobe. New focal consolidation in the posterior right upper lobe. Correlate for pneumonia versus radiation changes. *  No acute pathology or metastatic disease in the abdomen.  Patient discharged 8/5/2024 from [facility] for PNA and neutropenia.  Consideration for radiation pneumonitis however improved with abx. Now completed abx.   BAL 8/29/24: reactive benign cells  RLL transbronchial bx 8/29/24:  A. RIGHT LOWER LOBE LUNG, BIOPSY - Organizing pneumonia - Atypical pneumoyctes, with prominent nucleoli and mild nuclear hyperchromasia, highlighted by TTF1 and cytokeratin 7 stains, favored to be reactive - A stain for p40 supports this diagnosis - Deeper levels were examined  Admitted to [facility] for bronchoscopy BAL negative Evidence of organizing PNA  Plan: >CBC, CMP at every visit.  CEA added monthly >will hold Cycle 2 until cleared by Pulmonary. Currently on steroid taper >follow up with pulmonary  >follow up with Rad Onc for hilar RT >continue Lovenox at 80 mg daily >holding Entyvio after discussion with Dr. Dasilva and patient's preference. Will continue to monitor >Dexa scan for osteoporosis hx   RTC in 2 weeks

## 2024-10-01 NOTE — REVIEW OF SYSTEMS
[Negative] : Constitutional [Fever] : no fever [Chills] : no chills [Night Sweats] : no night sweats [Recent Change In Weight] : ~T no recent weight change [Dysphagia] : no dysphagia [Loss of Hearing] : no loss of hearing [Hoarseness] : no hoarseness [Odynophagia] : no odynophagia [Mucosal Pain] : no mucosal pain [Abdominal Pain] : no abdominal pain [Vomiting] : no vomiting [Constipation] : no constipation [Dizziness] : no dizziness [Suicidal] : not suicidal [Insomnia] : no insomnia [Proptosis] : no proptosis [Hot Flashes] : no hot flashes [Muscle Weakness] : no muscle weakness [Deepening Of The Voice] : no deepening of the voice [FreeTextEntry2] : No weight changes  [FreeTextEntry4] : feels she has post nasal drip,food has no sence of tase intermittently [FreeTextEntry5] : dizzyness improved,feels her heart goes fast from time to time [FreeTextEntry6] : Pulse oximeter 94 to 96 without oxygen,has oxygen at home [FreeTextEntry8] : at night stress with coughing [de-identified] : some brusing from lovenox [de-identified] : lovenox

## 2024-10-16 ENCOUNTER — APPOINTMENT (OUTPATIENT)
Dept: RADIATION ONCOLOGY | Facility: CLINIC | Age: 74
End: 2024-10-16
Payer: MEDICARE

## 2024-10-16 ENCOUNTER — NON-APPOINTMENT (OUTPATIENT)
Age: 74
End: 2024-10-16

## 2024-10-16 VITALS
OXYGEN SATURATION: 96 % | RESPIRATION RATE: 16 BRPM | SYSTOLIC BLOOD PRESSURE: 115 MMHG | DIASTOLIC BLOOD PRESSURE: 71 MMHG | HEART RATE: 114 BPM

## 2024-10-16 PROCEDURE — 99213 OFFICE O/P EST LOW 20 MIN: CPT

## 2024-10-30 ENCOUNTER — OFFICE (OUTPATIENT)
Dept: URBAN - METROPOLITAN AREA CLINIC 122 | Facility: CLINIC | Age: 74
Setting detail: OPHTHALMOLOGY
End: 2024-10-30
Payer: MEDICARE

## 2024-10-30 DIAGNOSIS — H52.7: ICD-10-CM

## 2024-10-30 PROCEDURE — 401 NO CHARGE VISIT: Performed by: OPHTHALMOLOGY

## 2024-10-30 ASSESSMENT — REFRACTION_MANIFEST
OS_ADD: +2.50
OD_AXIS: 60
OS_VA1: 20/40
OD_ADD: +2.50
OD_SPHERE: +1.50
OD_VA1: 20/40
OD_ADD: +2.50
OD_VA1: 20/20-
OS_CYLINDER: -2.00
OD_AXIS: 90
OS_SPHERE: +1.75
OS_AXIS: 90
OD_CYLINDER: -1.25
OS_ADD: +2.50
OS_AXIS: 85
OD_VA1: 20/70-
OD_SPHERE: +0.50
OS_VA1: 20/30
OD_AXIS: 85
OS_ADD: +2.50
OD_ADD: +2.50
OD_SPHERE: +1.50
OS_VA1: 20/20-
OS_CYLINDER: -1.25
OS_CYLINDER: -1.25
OD_SPHERE: +1.50
OS_ADD: +2.50
OS_AXIS: 90
OD_CYLINDER: -1.25
OD_VA1: 20/70
OS_CYLINDER: -1.50
OD_AXIS: 90
OD_ADD: +2.50
OS_SPHERE: +0.75
OS_AXIS: 90
OD_CYLINDER: -1.25
OD_CYLINDER: -1.25
OS_SPHERE: +1.25
OS_VA1: 20/40-2
OS_SPHERE: PLANO

## 2024-10-30 ASSESSMENT — REFRACTION_CURRENTRX
OD_VPRISM_DIRECTION: PROGS
OS_ADD: +2.50
OD_AXIS: 85
OS_SPHERE: +1.75
OS_AXIS: 90
OS_OVR_VA: 20/
OS_VPRISM_DIRECTION: PROGS
OS_ADD: +2.50
OD_SPHERE: +1.50
OD_CYLINDER: -1.25
OS_CYLINDER: -2.00
OS_AXIS: 85
OD_VPRISM_DIRECTION: PROGS
OS_CYLINDER: -1.25
OS_OVR_VA: 20/
OS_VPRISM_DIRECTION: PROGS
OD_SPHERE: +1.50
OD_ADD: +2.50
OD_OVR_VA: 20/
OD_ADD: +2.50
OS_SPHERE: +0.75
OD_CYLINDER: -1.25
OD_AXIS: 60
OD_OVR_VA: 20/

## 2024-10-30 ASSESSMENT — REFRACTION_AUTOREFRACTION
OS_SPHERE: +0.50
OD_SPHERE: +1.25
OD_CYLINDER: -3.00
OS_AXIS: 84
OS_CYLINDER: -1.75
OD_AXIS: 103

## 2024-10-30 ASSESSMENT — VISUAL ACUITY
OS_BCVA: 20/70
OD_BCVA: 20/30

## 2024-10-30 ASSESSMENT — TONOMETRY
OD_IOP_MMHG: 16
OS_IOP_MMHG: 16